# Patient Record
Sex: FEMALE | Race: WHITE | Employment: OTHER | URBAN - METROPOLITAN AREA
[De-identification: names, ages, dates, MRNs, and addresses within clinical notes are randomized per-mention and may not be internally consistent; named-entity substitution may affect disease eponyms.]

---

## 2021-01-01 ENCOUNTER — LAB (OUTPATIENT)
Dept: LAB | Facility: CLINIC | Age: 81
DRG: 454 | End: 2021-01-01
Attending: ORTHOPAEDIC SURGERY
Payer: MEDICARE

## 2021-01-01 ENCOUNTER — APPOINTMENT (OUTPATIENT)
Dept: PHYSICAL THERAPY | Facility: CLINIC | Age: 81
DRG: 454 | End: 2021-01-01
Attending: ORTHOPAEDIC SURGERY
Payer: MEDICARE

## 2021-01-01 ENCOUNTER — APPOINTMENT (OUTPATIENT)
Dept: GENERAL RADIOLOGY | Facility: CLINIC | Age: 81
DRG: 454 | End: 2021-01-01
Attending: ORTHOPAEDIC SURGERY
Payer: MEDICARE

## 2021-01-01 ENCOUNTER — ANESTHESIA (OUTPATIENT)
Dept: SURGERY | Facility: CLINIC | Age: 81
DRG: 454 | End: 2021-01-01
Payer: MEDICARE

## 2021-01-01 ENCOUNTER — HOSPITAL ENCOUNTER (INPATIENT)
Facility: CLINIC | Age: 81
LOS: 2 days | Discharge: HOME | DRG: 454 | End: 2021-10-14
Attending: ORTHOPAEDIC SURGERY | Admitting: ORTHOPAEDIC SURGERY
Payer: MEDICARE

## 2021-01-01 ENCOUNTER — APPOINTMENT (OUTPATIENT)
Dept: OCCUPATIONAL THERAPY | Facility: CLINIC | Age: 81
DRG: 454 | End: 2021-01-01
Attending: INTERNAL MEDICINE
Payer: MEDICARE

## 2021-01-01 ENCOUNTER — ANESTHESIA EVENT (OUTPATIENT)
Dept: SURGERY | Facility: CLINIC | Age: 81
DRG: 454 | End: 2021-01-01
Payer: MEDICARE

## 2021-01-01 ENCOUNTER — APPOINTMENT (OUTPATIENT)
Dept: OCCUPATIONAL THERAPY | Facility: CLINIC | Age: 81
DRG: 454 | End: 2021-01-01
Attending: ORTHOPAEDIC SURGERY
Payer: MEDICARE

## 2021-01-01 VITALS
HEART RATE: 89 BPM | BODY MASS INDEX: 31.63 KG/M2 | OXYGEN SATURATION: 100 % | WEIGHT: 171.9 LBS | DIASTOLIC BLOOD PRESSURE: 49 MMHG | HEIGHT: 62 IN | SYSTOLIC BLOOD PRESSURE: 117 MMHG | TEMPERATURE: 96.7 F | RESPIRATION RATE: 16 BRPM

## 2021-01-01 DIAGNOSIS — Z11.59 ENCOUNTER FOR SCREENING FOR OTHER VIRAL DISEASES: ICD-10-CM

## 2021-01-01 DIAGNOSIS — Z98.1 S/P LUMBAR FUSION: Primary | ICD-10-CM

## 2021-01-01 LAB
ABO/RH(D): NORMAL
ANION GAP SERPL CALCULATED.3IONS-SCNC: 4 MMOL/L (ref 3–14)
ANION GAP SERPL CALCULATED.3IONS-SCNC: 8 MMOL/L (ref 3–14)
ANTIBODY SCREEN: NEGATIVE
ATRIAL RATE - MUSE: 80 BPM
BUN SERPL-MCNC: 30 MG/DL (ref 7–30)
BUN SERPL-MCNC: 31 MG/DL (ref 7–30)
CALCIUM SERPL-MCNC: 8.2 MG/DL (ref 8.5–10.1)
CALCIUM SERPL-MCNC: 8.4 MG/DL (ref 8.5–10.1)
CHLORIDE BLD-SCNC: 105 MMOL/L (ref 94–109)
CHLORIDE BLD-SCNC: 110 MMOL/L (ref 94–109)
CO2 SERPL-SCNC: 20 MMOL/L (ref 20–32)
CO2 SERPL-SCNC: 24 MMOL/L (ref 20–32)
CREAT SERPL-MCNC: 1.37 MG/DL (ref 0.52–1.04)
CREAT SERPL-MCNC: 1.44 MG/DL (ref 0.52–1.04)
DIASTOLIC BLOOD PRESSURE - MUSE: NORMAL MMHG
GFR SERPL CREATININE-BSD FRML MDRD: 34 ML/MIN/1.73M2
GFR SERPL CREATININE-BSD FRML MDRD: 36 ML/MIN/1.73M2
GLUCOSE BLD-MCNC: 123 MG/DL (ref 70–99)
GLUCOSE BLD-MCNC: 125 MG/DL (ref 70–99)
GLUCOSE BLDC GLUCOMTR-MCNC: 108 MG/DL (ref 70–99)
GLUCOSE BLDC GLUCOMTR-MCNC: 118 MG/DL (ref 70–99)
GLUCOSE BLDC GLUCOMTR-MCNC: 122 MG/DL (ref 70–99)
GLUCOSE BLDC GLUCOMTR-MCNC: 123 MG/DL (ref 70–99)
GLUCOSE BLDC GLUCOMTR-MCNC: 124 MG/DL (ref 70–99)
GLUCOSE BLDC GLUCOMTR-MCNC: 127 MG/DL (ref 70–99)
GLUCOSE BLDC GLUCOMTR-MCNC: 127 MG/DL (ref 70–99)
GLUCOSE BLDC GLUCOMTR-MCNC: 129 MG/DL (ref 70–99)
GLUCOSE BLDC GLUCOMTR-MCNC: 131 MG/DL (ref 70–99)
GLUCOSE BLDC GLUCOMTR-MCNC: 138 MG/DL (ref 70–99)
GLUCOSE BLDC GLUCOMTR-MCNC: 141 MG/DL (ref 70–99)
GLUCOSE BLDC GLUCOMTR-MCNC: 148 MG/DL (ref 70–99)
GLUCOSE BLDC GLUCOMTR-MCNC: 153 MG/DL (ref 70–99)
GLUCOSE BLDC GLUCOMTR-MCNC: 93 MG/DL (ref 70–99)
HGB BLD-MCNC: 10 G/DL (ref 11.7–15.7)
HGB BLD-MCNC: 11.4 G/DL (ref 11.7–15.7)
INTERPRETATION ECG - MUSE: NORMAL
P AXIS - MUSE: NORMAL DEGREES
POTASSIUM BLD-SCNC: 4.6 MMOL/L (ref 3.4–5.3)
POTASSIUM BLD-SCNC: 4.8 MMOL/L (ref 3.4–5.3)
PR INTERVAL - MUSE: 162 MS
QRS DURATION - MUSE: 162 MS
QT - MUSE: 480 MS
QTC - MUSE: 553 MS
R AXIS - MUSE: -66 DEGREES
SARS-COV-2 RNA RESP QL NAA+PROBE: NEGATIVE
SODIUM SERPL-SCNC: 133 MMOL/L (ref 133–144)
SODIUM SERPL-SCNC: 138 MMOL/L (ref 133–144)
SPECIMEN EXPIRATION DATE: NORMAL
SYSTOLIC BLOOD PRESSURE - MUSE: NORMAL MMHG
T AXIS - MUSE: 112 DEGREES
VENTRICULAR RATE- MUSE: 80 BPM

## 2021-01-01 PROCEDURE — C1713 ANCHOR/SCREW BN/BN,TIS/BN: HCPCS | Performed by: ORTHOPAEDIC SURGERY

## 2021-01-01 PROCEDURE — 250N000011 HC RX IP 250 OP 636: Performed by: NURSE ANESTHETIST, CERTIFIED REGISTERED

## 2021-01-01 PROCEDURE — 258N000003 HC RX IP 258 OP 636: Performed by: NURSE ANESTHETIST, CERTIFIED REGISTERED

## 2021-01-01 PROCEDURE — 250N000013 HC RX MED GY IP 250 OP 250 PS 637: Performed by: INTERNAL MEDICINE

## 2021-01-01 PROCEDURE — 36415 COLL VENOUS BLD VENIPUNCTURE: CPT | Performed by: INTERNAL MEDICINE

## 2021-01-01 PROCEDURE — 80048 BASIC METABOLIC PNL TOTAL CA: CPT | Performed by: INTERNAL MEDICINE

## 2021-01-01 PROCEDURE — 36415 COLL VENOUS BLD VENIPUNCTURE: CPT | Performed by: ANESTHESIOLOGY

## 2021-01-01 PROCEDURE — 250N000009 HC RX 250: Performed by: ANESTHESIOLOGY

## 2021-01-01 PROCEDURE — 710N000010 HC RECOVERY PHASE 1, LEVEL 2, PER MIN: Performed by: ORTHOPAEDIC SURGERY

## 2021-01-01 PROCEDURE — 99232 SBSQ HOSP IP/OBS MODERATE 35: CPT | Performed by: INTERNAL MEDICINE

## 2021-01-01 PROCEDURE — 258N000003 HC RX IP 258 OP 636: Performed by: INTERNAL MEDICINE

## 2021-01-01 PROCEDURE — 250N000011 HC RX IP 250 OP 636: Performed by: INTERNAL MEDICINE

## 2021-01-01 PROCEDURE — 97530 THERAPEUTIC ACTIVITIES: CPT | Mod: GP | Performed by: PHYSICAL THERAPIST

## 2021-01-01 PROCEDURE — 250N000009 HC RX 250: Performed by: ORTHOPAEDIC SURGERY

## 2021-01-01 PROCEDURE — 97116 GAIT TRAINING THERAPY: CPT | Mod: GP | Performed by: PHYSICAL THERAPIST

## 2021-01-01 PROCEDURE — 360N000085 HC SURGERY LEVEL 5 W/ FLUORO, PER MIN: Performed by: ORTHOPAEDIC SURGERY

## 2021-01-01 PROCEDURE — 999N000179 XR SURGERY CARM FLUORO LESS THAN 5 MIN W STILLS: Mod: TC

## 2021-01-01 PROCEDURE — 250N000011 HC RX IP 250 OP 636: Performed by: PHYSICIAN ASSISTANT

## 2021-01-01 PROCEDURE — 86900 BLOOD TYPING SEROLOGIC ABO: CPT | Performed by: ANESTHESIOLOGY

## 2021-01-01 PROCEDURE — 8E0WXBF COMPUTER ASSISTED PROCEDURE OF TRUNK REGION, WITH FLUOROSCOPY: ICD-10-PCS | Performed by: ORTHOPAEDIC SURGERY

## 2021-01-01 PROCEDURE — 272N000001 HC OR GENERAL SUPPLY STERILE: Performed by: ORTHOPAEDIC SURGERY

## 2021-01-01 PROCEDURE — 0SG00AJ FUSION OF LUMBAR VERTEBRAL JOINT WITH INTERBODY FUSION DEVICE, POSTERIOR APPROACH, ANTERIOR COLUMN, OPEN APPROACH: ICD-10-PCS | Performed by: ORTHOPAEDIC SURGERY

## 2021-01-01 PROCEDURE — 0SG0071 FUSION OF LUMBAR VERTEBRAL JOINT WITH AUTOLOGOUS TISSUE SUBSTITUTE, POSTERIOR APPROACH, POSTERIOR COLUMN, OPEN APPROACH: ICD-10-PCS | Performed by: ORTHOPAEDIC SURGERY

## 2021-01-01 PROCEDURE — 97535 SELF CARE MNGMENT TRAINING: CPT | Mod: GO | Performed by: REHABILITATION PRACTITIONER

## 2021-01-01 PROCEDURE — 250N000009 HC RX 250: Performed by: NURSE ANESTHETIST, CERTIFIED REGISTERED

## 2021-01-01 PROCEDURE — 99223 1ST HOSP IP/OBS HIGH 75: CPT | Performed by: INTERNAL MEDICINE

## 2021-01-01 PROCEDURE — 120N000001 HC R&B MED SURG/OB

## 2021-01-01 PROCEDURE — 85018 HEMOGLOBIN: CPT | Performed by: INTERNAL MEDICINE

## 2021-01-01 PROCEDURE — 250N000005 HC OR RX SURGIFLO HEMOSTATIC MATRIX 10ML 199102S OPNP: Performed by: ORTHOPAEDIC SURGERY

## 2021-01-01 PROCEDURE — G0008 ADMIN INFLUENZA VIRUS VAC: HCPCS | Performed by: INTERNAL MEDICINE

## 2021-01-01 PROCEDURE — 370N000017 HC ANESTHESIA TECHNICAL FEE, PER MIN: Performed by: ORTHOPAEDIC SURGERY

## 2021-01-01 PROCEDURE — 258N000003 HC RX IP 258 OP 636: Performed by: PHYSICIAN ASSISTANT

## 2021-01-01 PROCEDURE — 97165 OT EVAL LOW COMPLEX 30 MIN: CPT | Mod: GO | Performed by: REHABILITATION PRACTITIONER

## 2021-01-01 PROCEDURE — U0003 INFECTIOUS AGENT DETECTION BY NUCLEIC ACID (DNA OR RNA); SEVERE ACUTE RESPIRATORY SYNDROME CORONAVIRUS 2 (SARS-COV-2) (CORONAVIRUS DISEASE [COVID-19]), AMPLIFIED PROBE TECHNIQUE, MAKING USE OF HIGH THROUGHPUT TECHNOLOGIES AS DESCRIBED BY CMS-2020-01-R: HCPCS

## 2021-01-01 PROCEDURE — 258N000003 HC RX IP 258 OP 636: Performed by: ANESTHESIOLOGY

## 2021-01-01 PROCEDURE — 90662 IIV NO PRSV INCREASED AG IM: CPT | Performed by: INTERNAL MEDICINE

## 2021-01-01 PROCEDURE — 97161 PT EVAL LOW COMPLEX 20 MIN: CPT | Mod: GP | Performed by: PHYSICAL THERAPIST

## 2021-01-01 PROCEDURE — 97535 SELF CARE MNGMENT TRAINING: CPT | Mod: GO

## 2021-01-01 PROCEDURE — 0SB20ZZ EXCISION OF LUMBAR VERTEBRAL DISC, OPEN APPROACH: ICD-10-PCS | Performed by: ORTHOPAEDIC SURGERY

## 2021-01-01 PROCEDURE — 999N000141 HC STATISTIC PRE-PROCEDURE NURSING ASSESSMENT: Performed by: ORTHOPAEDIC SURGERY

## 2021-01-01 PROCEDURE — 01NB0ZZ RELEASE LUMBAR NERVE, OPEN APPROACH: ICD-10-PCS | Performed by: ORTHOPAEDIC SURGERY

## 2021-01-01 PROCEDURE — C1762 CONN TISS, HUMAN(INC FASCIA): HCPCS | Performed by: ORTHOPAEDIC SURGERY

## 2021-01-01 DEVICE — GRAFT BONE MAGNIFUSE 1CMX5CM 7509215: Type: IMPLANTABLE DEVICE | Site: SPINE LUMBAR | Status: FUNCTIONAL

## 2021-01-01 DEVICE — IMPLANTABLE DEVICE: Type: IMPLANTABLE DEVICE | Site: SPINE LUMBAR | Status: FUNCTIONAL

## 2021-01-01 DEVICE — GRAFT BONE CRUSH CANC 15ML 400075: Type: IMPLANTABLE DEVICE | Site: SPINE LUMBAR | Status: FUNCTIONAL

## 2021-01-01 RX ORDER — CLINDAMYCIN PHOSPHATE 900 MG/50ML
900 INJECTION, SOLUTION INTRAVENOUS EVERY 8 HOURS
Status: COMPLETED | OUTPATIENT
Start: 2021-01-01 | End: 2021-01-01

## 2021-01-01 RX ORDER — OXYCODONE HYDROCHLORIDE 5 MG/1
5 TABLET ORAL EVERY 4 HOURS PRN
Status: DISCONTINUED | OUTPATIENT
Start: 2021-01-01 | End: 2021-01-01 | Stop reason: HOSPADM

## 2021-01-01 RX ORDER — GLYCOPYRROLATE 0.2 MG/ML
INJECTION, SOLUTION INTRAMUSCULAR; INTRAVENOUS PRN
Status: DISCONTINUED | OUTPATIENT
Start: 2021-01-01 | End: 2021-01-01

## 2021-01-01 RX ORDER — ACETAMINOPHEN 325 MG/1
975 TABLET ORAL EVERY 8 HOURS
Status: DISCONTINUED | OUTPATIENT
Start: 2021-01-01 | End: 2021-01-01 | Stop reason: HOSPADM

## 2021-01-01 RX ORDER — MAGNESIUM HYDROXIDE 1200 MG/15ML
LIQUID ORAL PRN
Status: DISCONTINUED | OUTPATIENT
Start: 2021-01-01 | End: 2021-01-01 | Stop reason: HOSPADM

## 2021-01-01 RX ORDER — OXYCODONE HYDROCHLORIDE 5 MG/1
5-10 TABLET ORAL EVERY 6 HOURS PRN
Qty: 30 TABLET | Refills: 0 | Status: SHIPPED | OUTPATIENT
Start: 2021-01-01

## 2021-01-01 RX ORDER — HYDRALAZINE HYDROCHLORIDE 20 MG/ML
2.5-5 INJECTION INTRAMUSCULAR; INTRAVENOUS EVERY 10 MIN PRN
Status: DISCONTINUED | OUTPATIENT
Start: 2021-01-01 | End: 2021-01-01 | Stop reason: HOSPADM

## 2021-01-01 RX ORDER — VITAMIN B COMPLEX
25 TABLET ORAL 2 TIMES DAILY
Qty: 180 TABLET | Refills: 0 | Status: SHIPPED | OUTPATIENT
Start: 2021-01-01

## 2021-01-01 RX ORDER — CLINDAMYCIN PHOSPHATE 900 MG/50ML
900 INJECTION, SOLUTION INTRAVENOUS
Status: COMPLETED | OUTPATIENT
Start: 2021-01-01 | End: 2021-01-01

## 2021-01-01 RX ORDER — GABAPENTIN 100 MG/1
100 CAPSULE ORAL
Status: DISCONTINUED | OUTPATIENT
Start: 2021-01-01 | End: 2021-01-01 | Stop reason: HOSPADM

## 2021-01-01 RX ORDER — LOSARTAN POTASSIUM 50 MG/1
50 TABLET ORAL DAILY
COMMUNITY
Start: 2021-01-01

## 2021-01-01 RX ORDER — LEVOTHYROXINE SODIUM 75 UG/1
75 TABLET ORAL DAILY
COMMUNITY

## 2021-01-01 RX ORDER — ONDANSETRON 2 MG/ML
4 INJECTION INTRAMUSCULAR; INTRAVENOUS EVERY 6 HOURS PRN
Status: DISCONTINUED | OUTPATIENT
Start: 2021-01-01 | End: 2021-01-01 | Stop reason: HOSPADM

## 2021-01-01 RX ORDER — POLYETHYLENE GLYCOL 3350 17 G/17G
17 POWDER, FOR SOLUTION ORAL DAILY
Status: DISCONTINUED | OUTPATIENT
Start: 2021-01-01 | End: 2021-01-01 | Stop reason: HOSPADM

## 2021-01-01 RX ORDER — BUPIVACAINE HYDROCHLORIDE AND EPINEPHRINE 2.5; 5 MG/ML; UG/ML
INJECTION, SOLUTION EPIDURAL; INFILTRATION; INTRACAUDAL; PERINEURAL PRN
Status: DISCONTINUED | OUTPATIENT
Start: 2021-01-01 | End: 2021-01-01 | Stop reason: HOSPADM

## 2021-01-01 RX ORDER — SODIUM CHLORIDE, SODIUM LACTATE, POTASSIUM CHLORIDE, CALCIUM CHLORIDE 600; 310; 30; 20 MG/100ML; MG/100ML; MG/100ML; MG/100ML
INJECTION, SOLUTION INTRAVENOUS CONTINUOUS
Status: DISCONTINUED | OUTPATIENT
Start: 2021-01-01 | End: 2021-01-01 | Stop reason: HOSPADM

## 2021-01-01 RX ORDER — DEXTROSE MONOHYDRATE 25 G/50ML
25-50 INJECTION, SOLUTION INTRAVENOUS
Status: DISCONTINUED | OUTPATIENT
Start: 2021-01-01 | End: 2021-01-01 | Stop reason: HOSPADM

## 2021-01-01 RX ORDER — NEOSTIGMINE METHYLSULFATE 1 MG/ML
VIAL (ML) INJECTION PRN
Status: DISCONTINUED | OUTPATIENT
Start: 2021-01-01 | End: 2021-01-01

## 2021-01-01 RX ORDER — LIDOCAINE 40 MG/G
CREAM TOPICAL
Status: DISCONTINUED | OUTPATIENT
Start: 2021-01-01 | End: 2021-01-01 | Stop reason: HOSPADM

## 2021-01-01 RX ORDER — HYDROMORPHONE HCL IN WATER/PF 6 MG/30 ML
0.4 PATIENT CONTROLLED ANALGESIA SYRINGE INTRAVENOUS EVERY 5 MIN PRN
Status: DISCONTINUED | OUTPATIENT
Start: 2021-01-01 | End: 2021-01-01 | Stop reason: HOSPADM

## 2021-01-01 RX ORDER — AMOXICILLIN 250 MG
1 CAPSULE ORAL 2 TIMES DAILY
Status: DISCONTINUED | OUTPATIENT
Start: 2021-01-01 | End: 2021-01-01 | Stop reason: HOSPADM

## 2021-01-01 RX ORDER — PROCHLORPERAZINE MALEATE 5 MG
5 TABLET ORAL EVERY 6 HOURS PRN
Status: DISCONTINUED | OUTPATIENT
Start: 2021-01-01 | End: 2021-01-01 | Stop reason: HOSPADM

## 2021-01-01 RX ORDER — FUROSEMIDE 20 MG
20 TABLET ORAL DAILY PRN
COMMUNITY
Start: 2021-02-22

## 2021-01-01 RX ORDER — EPHEDRINE SULFATE 50 MG/ML
INJECTION, SOLUTION INTRAVENOUS PRN
Status: DISCONTINUED | OUTPATIENT
Start: 2021-01-01 | End: 2021-01-01

## 2021-01-01 RX ORDER — NICOTINE POLACRILEX 4 MG
15-30 LOZENGE BUCCAL
Status: DISCONTINUED | OUTPATIENT
Start: 2021-01-01 | End: 2021-01-01 | Stop reason: HOSPADM

## 2021-01-01 RX ORDER — PHENYLEPHRINE HYDROCHLORIDE 10 MG/ML
INJECTION INTRAVENOUS PRN
Status: DISCONTINUED | OUTPATIENT
Start: 2021-01-01 | End: 2021-01-01

## 2021-01-01 RX ORDER — ONDANSETRON 2 MG/ML
4 INJECTION INTRAMUSCULAR; INTRAVENOUS EVERY 30 MIN PRN
Status: DISCONTINUED | OUTPATIENT
Start: 2021-01-01 | End: 2021-01-01 | Stop reason: HOSPADM

## 2021-01-01 RX ORDER — ONDANSETRON 4 MG/1
4 TABLET, ORALLY DISINTEGRATING ORAL EVERY 30 MIN PRN
Status: DISCONTINUED | OUTPATIENT
Start: 2021-01-01 | End: 2021-01-01 | Stop reason: HOSPADM

## 2021-01-01 RX ORDER — SODIUM CHLORIDE, SODIUM LACTATE, POTASSIUM CHLORIDE, CALCIUM CHLORIDE 600; 310; 30; 20 MG/100ML; MG/100ML; MG/100ML; MG/100ML
INJECTION, SOLUTION INTRAVENOUS CONTINUOUS PRN
Status: DISCONTINUED | OUTPATIENT
Start: 2021-01-01 | End: 2021-01-01

## 2021-01-01 RX ORDER — CARVEDILOL 25 MG/1
25 TABLET ORAL 2 TIMES DAILY
COMMUNITY
Start: 2021-02-26

## 2021-01-01 RX ORDER — DEXAMETHASONE SODIUM PHOSPHATE 4 MG/ML
INJECTION, SOLUTION INTRA-ARTICULAR; INTRALESIONAL; INTRAMUSCULAR; INTRAVENOUS; SOFT TISSUE PRN
Status: DISCONTINUED | OUTPATIENT
Start: 2021-01-01 | End: 2021-01-01

## 2021-01-01 RX ORDER — PROPOFOL 10 MG/ML
INJECTION, EMULSION INTRAVENOUS PRN
Status: DISCONTINUED | OUTPATIENT
Start: 2021-01-01 | End: 2021-01-01

## 2021-01-01 RX ORDER — VITAMIN B COMPLEX
25 TABLET ORAL 2 TIMES DAILY
Status: DISCONTINUED | OUTPATIENT
Start: 2021-01-01 | End: 2021-01-01 | Stop reason: HOSPADM

## 2021-01-01 RX ORDER — DAPAGLIFLOZIN 10 MG/1
10 TABLET, FILM COATED ORAL DAILY
COMMUNITY
Start: 2021-01-01

## 2021-01-01 RX ORDER — HYDROMORPHONE HCL IN WATER/PF 6 MG/30 ML
0.4 PATIENT CONTROLLED ANALGESIA SYRINGE INTRAVENOUS
Status: DISCONTINUED | OUTPATIENT
Start: 2021-01-01 | End: 2021-01-01 | Stop reason: HOSPADM

## 2021-01-01 RX ORDER — ACETAMINOPHEN 325 MG/1
650 TABLET ORAL EVERY 4 HOURS PRN
Qty: 100 TABLET | Refills: 0 | Status: SHIPPED | OUTPATIENT
Start: 2021-01-01

## 2021-01-01 RX ORDER — CARVEDILOL 6.25 MG/1
6.25 TABLET ORAL
Status: DISCONTINUED | OUTPATIENT
Start: 2021-01-01 | End: 2021-01-01

## 2021-01-01 RX ORDER — BISACODYL 10 MG
10 SUPPOSITORY, RECTAL RECTAL DAILY PRN
Status: DISCONTINUED | OUTPATIENT
Start: 2021-01-01 | End: 2021-01-01 | Stop reason: HOSPADM

## 2021-01-01 RX ORDER — CARVEDILOL 12.5 MG/1
12.5 TABLET ORAL
Status: DISCONTINUED | OUTPATIENT
Start: 2021-01-01 | End: 2021-01-01 | Stop reason: HOSPADM

## 2021-01-01 RX ORDER — ONDANSETRON 4 MG/1
4 TABLET, ORALLY DISINTEGRATING ORAL EVERY 6 HOURS PRN
Status: DISCONTINUED | OUTPATIENT
Start: 2021-01-01 | End: 2021-01-01 | Stop reason: HOSPADM

## 2021-01-01 RX ORDER — OXYCODONE HYDROCHLORIDE 5 MG/1
10 TABLET ORAL EVERY 4 HOURS PRN
Status: DISCONTINUED | OUTPATIENT
Start: 2021-01-01 | End: 2021-01-01 | Stop reason: HOSPADM

## 2021-01-01 RX ORDER — ALBUTEROL SULFATE 0.83 MG/ML
2.5 SOLUTION RESPIRATORY (INHALATION) EVERY 4 HOURS PRN
Status: DISCONTINUED | OUTPATIENT
Start: 2021-01-01 | End: 2021-01-01 | Stop reason: HOSPADM

## 2021-01-01 RX ORDER — ONDANSETRON 2 MG/ML
INJECTION INTRAMUSCULAR; INTRAVENOUS PRN
Status: DISCONTINUED | OUTPATIENT
Start: 2021-01-01 | End: 2021-01-01

## 2021-01-01 RX ORDER — FENTANYL CITRATE 50 UG/ML
50 INJECTION, SOLUTION INTRAMUSCULAR; INTRAVENOUS EVERY 5 MIN PRN
Status: DISCONTINUED | OUTPATIENT
Start: 2021-01-01 | End: 2021-01-01 | Stop reason: HOSPADM

## 2021-01-01 RX ORDER — ACETAMINOPHEN 325 MG/1
650 TABLET ORAL EVERY 4 HOURS PRN
Status: DISCONTINUED | OUTPATIENT
Start: 2021-01-01 | End: 2021-01-01 | Stop reason: HOSPADM

## 2021-01-01 RX ORDER — HYDROMORPHONE HCL IN WATER/PF 6 MG/30 ML
0.2 PATIENT CONTROLLED ANALGESIA SYRINGE INTRAVENOUS
Status: DISCONTINUED | OUTPATIENT
Start: 2021-01-01 | End: 2021-01-01 | Stop reason: HOSPADM

## 2021-01-01 RX ORDER — FENTANYL CITRATE 50 UG/ML
INJECTION, SOLUTION INTRAMUSCULAR; INTRAVENOUS PRN
Status: DISCONTINUED | OUTPATIENT
Start: 2021-01-01 | End: 2021-01-01

## 2021-01-01 RX ORDER — CLINDAMYCIN PHOSPHATE 900 MG/50ML
900 INJECTION, SOLUTION INTRAVENOUS SEE ADMIN INSTRUCTIONS
Status: DISCONTINUED | OUTPATIENT
Start: 2021-01-01 | End: 2021-01-01 | Stop reason: HOSPADM

## 2021-01-01 RX ORDER — LABETALOL HYDROCHLORIDE 5 MG/ML
10 INJECTION, SOLUTION INTRAVENOUS
Status: DISCONTINUED | OUTPATIENT
Start: 2021-01-01 | End: 2021-01-01 | Stop reason: HOSPADM

## 2021-01-01 RX ORDER — LEVOTHYROXINE SODIUM 75 UG/1
75 TABLET ORAL DAILY
Status: DISCONTINUED | OUTPATIENT
Start: 2021-01-01 | End: 2021-01-01 | Stop reason: HOSPADM

## 2021-01-01 RX ORDER — HYDROXYZINE HYDROCHLORIDE 10 MG/1
10 TABLET, FILM COATED ORAL EVERY 6 HOURS PRN
Status: DISCONTINUED | OUTPATIENT
Start: 2021-01-01 | End: 2021-01-01 | Stop reason: HOSPADM

## 2021-01-01 RX ORDER — LOSARTAN POTASSIUM 100 MG/1
100 TABLET ORAL DAILY
COMMUNITY
Start: 2021-01-13

## 2021-01-01 RX ORDER — NALOXONE HYDROCHLORIDE 0.4 MG/ML
0.2 INJECTION, SOLUTION INTRAMUSCULAR; INTRAVENOUS; SUBCUTANEOUS
Status: DISCONTINUED | OUTPATIENT
Start: 2021-01-01 | End: 2021-01-01 | Stop reason: HOSPADM

## 2021-01-01 RX ORDER — AMOXICILLIN 250 MG
1-2 CAPSULE ORAL 2 TIMES DAILY
Qty: 30 TABLET | Refills: 0 | Status: SHIPPED | OUTPATIENT
Start: 2021-01-01

## 2021-01-01 RX ORDER — NALOXONE HYDROCHLORIDE 0.4 MG/ML
0.4 INJECTION, SOLUTION INTRAMUSCULAR; INTRAVENOUS; SUBCUTANEOUS
Status: DISCONTINUED | OUTPATIENT
Start: 2021-01-01 | End: 2021-01-01 | Stop reason: HOSPADM

## 2021-01-01 RX ORDER — SODIUM CHLORIDE 9 MG/ML
INJECTION, SOLUTION INTRAVENOUS CONTINUOUS
Status: DISCONTINUED | OUTPATIENT
Start: 2021-01-01 | End: 2021-01-01

## 2021-01-01 RX ORDER — SPIRONOLACTONE 25 MG/1
25 TABLET ORAL DAILY
COMMUNITY
Start: 2020-09-17

## 2021-01-01 RX ADMIN — CLINDAMYCIN PHOSPHATE 900 MG: 900 INJECTION, SOLUTION INTRAVENOUS at 23:38

## 2021-01-01 RX ADMIN — PHENYLEPHRINE HYDROCHLORIDE 100 MCG: 10 INJECTION INTRAVENOUS at 08:48

## 2021-01-01 RX ADMIN — ACETAMINOPHEN 975 MG: 325 TABLET, FILM COATED ORAL at 20:37

## 2021-01-01 RX ADMIN — HYDROMORPHONE HYDROCHLORIDE 0.25 MG: 1 INJECTION, SOLUTION INTRAMUSCULAR; INTRAVENOUS; SUBCUTANEOUS at 08:53

## 2021-01-01 RX ADMIN — HYDROMORPHONE HYDROCHLORIDE 0.25 MG: 1 INJECTION, SOLUTION INTRAMUSCULAR; INTRAVENOUS; SUBCUTANEOUS at 10:21

## 2021-01-01 RX ADMIN — ROCURONIUM BROMIDE 10 MG: 50 INJECTION, SOLUTION INTRAVENOUS at 09:45

## 2021-01-01 RX ADMIN — HYDROMORPHONE HYDROCHLORIDE 0.2 MG: 0.2 INJECTION, SOLUTION INTRAMUSCULAR; INTRAVENOUS; SUBCUTANEOUS at 04:59

## 2021-01-01 RX ADMIN — FENTANYL CITRATE 100 MCG: 50 INJECTION, SOLUTION INTRAMUSCULAR; INTRAVENOUS at 08:14

## 2021-01-01 RX ADMIN — LEVOTHYROXINE SODIUM 75 MCG: 0.07 TABLET ORAL at 09:33

## 2021-01-01 RX ADMIN — Medication 1 TABLET: at 06:05

## 2021-01-01 RX ADMIN — PHENYLEPHRINE HYDROCHLORIDE 100 MCG: 10 INJECTION INTRAVENOUS at 08:42

## 2021-01-01 RX ADMIN — EPHEDRINE SULFATE 5 MG: 50 INJECTION, SOLUTION INTRAVENOUS at 08:26

## 2021-01-01 RX ADMIN — ACETAMINOPHEN 975 MG: 325 TABLET, FILM COATED ORAL at 12:37

## 2021-01-01 RX ADMIN — CARVEDILOL 12.5 MG: 12.5 TABLET, FILM COATED ORAL at 17:25

## 2021-01-01 RX ADMIN — OMEPRAZOLE 20 MG: 20 CAPSULE, DELAYED RELEASE ORAL at 06:31

## 2021-01-01 RX ADMIN — OXYCODONE HYDROCHLORIDE 5 MG: 5 TABLET ORAL at 14:47

## 2021-01-01 RX ADMIN — EPHEDRINE SULFATE 5 MG: 50 INJECTION, SOLUTION INTRAVENOUS at 08:25

## 2021-01-01 RX ADMIN — CARVEDILOL 12.5 MG: 12.5 TABLET, FILM COATED ORAL at 06:31

## 2021-01-01 RX ADMIN — ONDANSETRON HYDROCHLORIDE 4 MG: 2 INJECTION, SOLUTION INTRAVENOUS at 10:42

## 2021-01-01 RX ADMIN — SODIUM CHLORIDE, SODIUM LACTATE, POTASSIUM CHLORIDE, CALCIUM CHLORIDE: 600; 310; 30; 20 INJECTION, SOLUTION INTRAVENOUS at 09:07

## 2021-01-01 RX ADMIN — Medication 25 MCG: at 19:09

## 2021-01-01 RX ADMIN — PHENYLEPHRINE HYDROCHLORIDE 100 MCG: 10 INJECTION INTRAVENOUS at 09:02

## 2021-01-01 RX ADMIN — CARVEDILOL 12.5 MG: 12.5 TABLET, FILM COATED ORAL at 06:04

## 2021-01-01 RX ADMIN — ACETAMINOPHEN 975 MG: 325 TABLET, FILM COATED ORAL at 05:00

## 2021-01-01 RX ADMIN — OMEPRAZOLE 20 MG: 20 CAPSULE, DELAYED RELEASE ORAL at 06:04

## 2021-01-01 RX ADMIN — PHENYLEPHRINE HYDROCHLORIDE 200 MCG: 10 INJECTION INTRAVENOUS at 09:07

## 2021-01-01 RX ADMIN — NEOSTIGMINE METHYLSULFATE 4 MG: 1 INJECTION, SOLUTION INTRAVENOUS at 10:46

## 2021-01-01 RX ADMIN — PHENYLEPHRINE HYDROCHLORIDE 0.5 MCG/KG/MIN: 10 INJECTION INTRAVENOUS at 09:07

## 2021-01-01 RX ADMIN — Medication 1 TABLET: at 13:16

## 2021-01-01 RX ADMIN — CLINDAMYCIN PHOSPHATE 900 MG: 900 INJECTION, SOLUTION INTRAVENOUS at 15:32

## 2021-01-01 RX ADMIN — Medication 1 TABLET: at 16:17

## 2021-01-01 RX ADMIN — Medication 25 MCG: at 20:37

## 2021-01-01 RX ADMIN — HYDROXYZINE HYDROCHLORIDE 10 MG: 10 TABLET ORAL at 13:16

## 2021-01-01 RX ADMIN — LEVOTHYROXINE SODIUM 75 MCG: 0.07 TABLET ORAL at 06:05

## 2021-01-01 RX ADMIN — OXYCODONE HYDROCHLORIDE 5 MG: 5 TABLET ORAL at 16:17

## 2021-01-01 RX ADMIN — PHENYLEPHRINE HYDROCHLORIDE 100 MCG: 10 INJECTION INTRAVENOUS at 08:17

## 2021-01-01 RX ADMIN — HYDROMORPHONE HYDROCHLORIDE 0.25 MG: 1 INJECTION, SOLUTION INTRAMUSCULAR; INTRAVENOUS; SUBCUTANEOUS at 09:57

## 2021-01-01 RX ADMIN — PHENYLEPHRINE HYDROCHLORIDE 100 MCG: 10 INJECTION INTRAVENOUS at 08:30

## 2021-01-01 RX ADMIN — OXYCODONE HYDROCHLORIDE 5 MG: 5 TABLET ORAL at 03:09

## 2021-01-01 RX ADMIN — ACETAMINOPHEN 975 MG: 325 TABLET, FILM COATED ORAL at 06:04

## 2021-01-01 RX ADMIN — EPHEDRINE SULFATE 5 MG: 50 INJECTION, SOLUTION INTRAVENOUS at 08:17

## 2021-01-01 RX ADMIN — CLINDAMYCIN PHOSPHATE 900 MG: 900 INJECTION, SOLUTION INTRAVENOUS at 08:07

## 2021-01-01 RX ADMIN — EPHEDRINE SULFATE 5 MG: 50 INJECTION, SOLUTION INTRAVENOUS at 10:36

## 2021-01-01 RX ADMIN — PHENYLEPHRINE HYDROCHLORIDE 100 MCG: 10 INJECTION INTRAVENOUS at 09:00

## 2021-01-01 RX ADMIN — PHENYLEPHRINE HYDROCHLORIDE 100 MCG: 10 INJECTION INTRAVENOUS at 08:38

## 2021-01-01 RX ADMIN — PHENYLEPHRINE HYDROCHLORIDE 100 MCG: 10 INJECTION INTRAVENOUS at 08:25

## 2021-01-01 RX ADMIN — EPHEDRINE SULFATE 10 MG: 50 INJECTION, SOLUTION INTRAVENOUS at 08:36

## 2021-01-01 RX ADMIN — DEXAMETHASONE SODIUM PHOSPHATE 4 MG: 4 INJECTION, SOLUTION INTRA-ARTICULAR; INTRALESIONAL; INTRAMUSCULAR; INTRAVENOUS; SOFT TISSUE at 08:14

## 2021-01-01 RX ADMIN — GLYCOPYRROLATE 0.6 MG: 0.2 INJECTION, SOLUTION INTRAMUSCULAR; INTRAVENOUS at 10:46

## 2021-01-01 RX ADMIN — PROPOFOL 70 MG: 10 INJECTION, EMULSION INTRAVENOUS at 08:14

## 2021-01-01 RX ADMIN — INFLUENZA A VIRUS A/VICTORIA/2570/2019 IVR-215 (H1N1) ANTIGEN (FORMALDEHYDE INACTIVATED), INFLUENZA A VIRUS A/TASMANIA/503/2020 IVR-221 (H3N2) ANTIGEN (FORMALDEHYDE INACTIVATED), INFLUENZA B VIRUS B/PHUKET/3073/2013 ANTIGEN (FORMALDEHYDE INACTIVATED), AND INFLUENZA B VIRUS B/WASHINGTON/02/2019 ANTIGEN (FORMALDEHYDE INACTIVATED) 0.7 ML: 60; 60; 60; 60 INJECTION, SUSPENSION INTRAMUSCULAR at 12:37

## 2021-01-01 RX ADMIN — EPHEDRINE SULFATE 10 MG: 50 INJECTION, SOLUTION INTRAVENOUS at 08:48

## 2021-01-01 RX ADMIN — PROPOFOL 50 MCG/KG/MIN: 10 INJECTION, EMULSION INTRAVENOUS at 08:35

## 2021-01-01 RX ADMIN — Medication 25 MCG: at 08:31

## 2021-01-01 RX ADMIN — Medication 1 TABLET: at 06:32

## 2021-01-01 RX ADMIN — OXYCODONE HYDROCHLORIDE 5 MG: 5 TABLET ORAL at 08:30

## 2021-01-01 RX ADMIN — OXYCODONE HYDROCHLORIDE 5 MG: 5 TABLET ORAL at 01:55

## 2021-01-01 RX ADMIN — ROCURONIUM BROMIDE 50 MG: 50 INJECTION, SOLUTION INTRAVENOUS at 08:14

## 2021-01-01 RX ADMIN — SENNOSIDES AND DOCUSATE SODIUM 1 TABLET: 50; 8.6 TABLET ORAL at 09:33

## 2021-01-01 RX ADMIN — EPHEDRINE SULFATE 5 MG: 50 INJECTION, SOLUTION INTRAVENOUS at 09:34

## 2021-01-01 RX ADMIN — HYDROMORPHONE HYDROCHLORIDE 0.25 MG: 1 INJECTION, SOLUTION INTRAMUSCULAR; INTRAVENOUS; SUBCUTANEOUS at 10:29

## 2021-01-01 RX ADMIN — SENNOSIDES AND DOCUSATE SODIUM 1 TABLET: 50; 8.6 TABLET ORAL at 20:36

## 2021-01-01 RX ADMIN — SODIUM CHLORIDE: 9 INJECTION, SOLUTION INTRAVENOUS at 13:59

## 2021-01-01 RX ADMIN — OXYCODONE HYDROCHLORIDE 5 MG: 5 TABLET ORAL at 09:34

## 2021-01-01 RX ADMIN — PHENYLEPHRINE HYDROCHLORIDE 100 MCG: 10 INJECTION INTRAVENOUS at 08:14

## 2021-01-01 RX ADMIN — ACETAMINOPHEN 975 MG: 325 TABLET, FILM COATED ORAL at 21:25

## 2021-01-01 RX ADMIN — LIDOCAINE HYDROCHLORIDE 5 ML: 10 INJECTION, SOLUTION EPIDURAL; INFILTRATION; INTRACAUDAL; PERINEURAL at 08:14

## 2021-01-01 RX ADMIN — Medication 1 TABLET: at 12:37

## 2021-01-01 RX ADMIN — PROCHLORPERAZINE EDISYLATE 5 MG: 5 INJECTION INTRAMUSCULAR; INTRAVENOUS at 18:16

## 2021-01-01 RX ADMIN — SODIUM CHLORIDE: 9 INJECTION, SOLUTION INTRAVENOUS at 04:59

## 2021-01-01 RX ADMIN — SODIUM CHLORIDE, POTASSIUM CHLORIDE, SODIUM LACTATE AND CALCIUM CHLORIDE: 600; 310; 30; 20 INJECTION, SOLUTION INTRAVENOUS at 07:00

## 2021-01-01 RX ADMIN — EPHEDRINE SULFATE 10 MG: 50 INJECTION, SOLUTION INTRAVENOUS at 09:04

## 2021-01-01 RX ADMIN — ACETAMINOPHEN 975 MG: 325 TABLET, FILM COATED ORAL at 13:15

## 2021-01-01 RX ADMIN — Medication 1 TABLET: at 17:25

## 2021-01-01 RX ADMIN — SENNOSIDES AND DOCUSATE SODIUM 1 TABLET: 50; 8.6 TABLET ORAL at 08:31

## 2021-01-01 RX ADMIN — ONDANSETRON 4 MG: 2 INJECTION INTRAMUSCULAR; INTRAVENOUS at 15:43

## 2021-01-01 RX ADMIN — PHENYLEPHRINE HYDROCHLORIDE 100 MCG: 10 INJECTION INTRAVENOUS at 08:33

## 2021-01-01 RX ADMIN — OXYCODONE HYDROCHLORIDE 5 MG: 5 TABLET ORAL at 19:09

## 2021-01-01 ASSESSMENT — ACTIVITIES OF DAILY LIVING (ADL)
ADLS_ACUITY_SCORE: 7
ADLS_ACUITY_SCORE: 6
ADLS_ACUITY_SCORE: 8
ADLS_ACUITY_SCORE: 6
DIFFICULTY_COMMUNICATING: NO
ADLS_ACUITY_SCORE: 7
ADLS_ACUITY_SCORE: 9
DOING_ERRANDS_INDEPENDENTLY_DIFFICULTY: NO
ADLS_ACUITY_SCORE: 6
ADLS_ACUITY_SCORE: 6
ADLS_ACUITY_SCORE: 8
ADLS_ACUITY_SCORE: 7
ADLS_ACUITY_SCORE: 8
ADLS_ACUITY_SCORE: 7
ADLS_ACUITY_SCORE: 6
DRESSING/BATHING_DIFFICULTY: NO
ADLS_ACUITY_SCORE: 7
CONCENTRATING,_REMEMBERING_OR_MAKING_DECISIONS_DIFFICULTY: NO
FALL_HISTORY_WITHIN_LAST_SIX_MONTHS: NO
WALKING_OR_CLIMBING_STAIRS_DIFFICULTY: NO
DIFFICULTY_EATING/SWALLOWING: NO
TOILETING_ISSUES: NO

## 2021-01-01 ASSESSMENT — MIFFLIN-ST. JEOR
SCORE: 1180.76
SCORE: 1202.98

## 2021-10-12 PROBLEM — Z98.1 S/P LUMBAR FUSION: Status: ACTIVE | Noted: 2021-01-01

## 2021-10-12 NOTE — BRIEF OP NOTE
[facility] Brief Operative Note    Pre-operative diagnosis: L45 Spinal stenosis [M48.00]  Neurogenic claudication (H) [G95.19]  Spondylolisthesis [M43.10]  DDD (degenerative disc disease), cervical [M50.30]   Post-operative diagnosis * No post-op diagnosis entered *  same   Procedure: Procedure(s):  Lumbar 4-5 Posterior Lumbar Instrumented Fusion With Interbody Cage and With Laminectomies   Surgeon(s): Surgeon(s) and Role:     * Mark Tomlin MD - Primary     * Arik Simons PA-C - Assisting   Estimated blood loss: 200 mL    Specimens: * No specimens in log *   Findings: No comp    Mark Tomlin MD

## 2021-10-12 NOTE — ANESTHESIA CARE TRANSFER NOTE
Patient: Neha Oviedo    Procedure: Procedure(s):  Lumbar 4-5 Posterior Lumbar Instrumented Fusion With Interbody Cage and With Laminectomies       Diagnosis: Spinal stenosis [M48.00]  Neurogenic claudication (H) [G95.19]  Spondylolisthesis [M43.10]  DDD (degenerative disc disease), cervical [M50.30]  Diagnosis Additional Information: No value filed.    Anesthesia Type:   General     Note:    Oropharynx: oropharynx clear of all foreign objects  Level of Consciousness: awake  Oxygen Supplementation: face mask  Level of Supplemental Oxygen (L/min / FiO2): 6 lpm  Independent Airway: airway patency satisfactory and stable  Dentition: dentition unchanged  Vital Signs Stable: post-procedure vital signs reviewed and stable  Report to RN Given: handoff report given  Patient transferred to: PACU  Comments: Patient oral suctioned. Patient with spontaneous respirations and adequate tidal volumes. Patient awake and responsive. Extubated in OR to 6L facemask. To PACU ventilating well. VSS. Report given.  Handoff Report: Identifed the Patient, Identified the Reponsible Provider, Reviewed the pertinent medical history, Discussed the surgical course, Reviewed Intra-OP anesthesia mangement and issues during anesthesia, Set expectations for post-procedure period and Allowed opportunity for questions and acknowledgement of understanding      Vitals:  Vitals Value Taken Time   BP 95/60 10/12/21 1115   Temp     Pulse 81 10/12/21 1116   Resp 15 10/12/21 1116   SpO2 99 % 10/12/21 1116   Vitals shown include unvalidated device data.    Electronically Signed By: SHYAM Coronado CRNA  October 12, 2021  11:18 AM   IV fluids and anti-emetics to be give on pump d/c day. Orders in and signed. Encouraged to call with questions. Navigation will continue to follow.

## 2021-10-12 NOTE — PHARMACY-ADMISSION MEDICATION HISTORY
Pharmacy reviewed prior to admission med list from pre-admitting rnROSA.        Prior to Admission medications    Medication Sig Last Dose Taking? Auth Provider   acetaminophen-codeine (TYLENOL #3) 300-30 MG tablet Take 1-2 tablets by mouth every 6 hours as needed for severe pain 10/11/2021 at 1700 Yes Reported, Patient   calcium carbonate-vitamin D 600-125 MG-UNIT TABS Take by mouth daily 10/11/2021 at 1700 Yes Reported, Patient   carvedilol (COREG) 25 MG tablet Take 25 mg by mouth 2 times daily 10/12/2021 at 0445 Yes Reported, Patient   dapagliflozin (FARXIGA) 10 MG TABS tablet Take 10 mg by mouth daily 10/11/2021 at 0800 Yes Reported, Patient   furosemide (LASIX) 20 MG tablet Take 20 mg by mouth daily as needed for edema Unknown at Unknown time Yes Reported, Patient   levothyroxine (SYNTHROID/LEVOTHROID) 75 MCG tablet Take 75 mcg by mouth daily 10/12/2021 at 0445 Yes Reported, Patient   losartan (COZAAR) 100 MG tablet Take 100 mg by mouth daily Total dose 150 mg 10/12/2021 at 0445 Yes Reported, Patient   losartan (COZAAR) 50 MG tablet Take 50 mg by mouth daily Total dose 150 mg daily 10/12/2021 at 0445 Yes Reported, Patient   MAGNESIUM PO Take 300 mg by mouth daily 10/11/2021 at 1700 Yes Reported, Patient   Menthol-Methyl Salicylate (SALONPAS PAIN RELIEF PATCH EX) Externally apply topically daily as needed Unknown at Unknown time Yes Reported, Patient   omeprazole (PRILOSEC) 20 MG DR capsule Take 20 mg by mouth daily 10/12/2021 at 0445 Yes Reported, Patient   spironolactone (ALDACTONE) 25 MG tablet Take 25 mg by mouth daily 10/11/2021 at 0800 Yes Reported, Patient   aspirin (ASA) 81 MG EC tablet Take 81 mg by mouth daily  9/29/2021  Reported, Patient

## 2021-10-12 NOTE — LETTER
Shari Rodriges RN Case Manager  Inpatient Care Coordination  [facility]   [phone]    Home Care PT  Discharge Today 10/14

## 2021-10-12 NOTE — OP NOTE
Procedure Date: 10/12/2021    PREOPERATIVE DIAGNOSES:    1.  L4 to L5 degenerative spondylolisthesis.  2.  Severe spinal stenosis.  3.  L4 to L5 degenerative disk disease.  4.  Severe neurogenic claudication.    POSTOPERATIVE DIAGNOSES:    1.  L4 to L5 degenerative spondylolisthesis.  2.  Severe spinal stenosis.  3.  L4 to L5 degenerative disk disease.  4.  Severe neurogenic claudication.    PROCEDURES PERFORMED:  1.  L4 to L5 transforaminal lumbar interbody fusion.  2.  Application of intervertebral biomechanical device for interbody fusion purposes.  3.  Posterior fusion using a posterior intertransverse membrane technique.  4.  Posterior instrumentation using bilateral pedicle screw and krista construct spanning from L4 to L5.  5.  Local autograft bone.  6.  Crushed cancellous allograft bone.  7.  Central laminectomy L4.  8.  Central laminectomy L5.  9.  Fluoroscopy.  10.  Medtronic O-arm.    SURGEON:  Mark Tomlin MD    ASSISTANT:  Arik Simons PA-C    ANESTHESIA:  General endotracheal anesthesia without complication.    COMPLICATIONS:  None.    DRAINS:  One Hemovac drain placed prior to closure routinely deep to the lumbar fascia and taken out a separate poke hole and tied to the skin with a nylon stitch to prevent backout.    ESTIMATED BLOOD LOSS:  200 mL.    FINDINGS:  Full decompression L4 to L5 without complications.    INDICATIONS FOR PROCEDURE:  Neha is an 81-year-old female who had [facility] with severe neurogenic claudication.  She had severe end-stage spinal stenosis seen on a CT myelogram.  She had earlier scheduled the same surgery, but had to cancel due to some health concerns of her 's.  She later presented once again wanting to proceed with surgery.  She had presented with a consent form, which was read, understood and signed.  She had failed all conservative care prior to that and essentially had no meaningful options moving forward other than surgery due  to the structural nature of her diagnosis.    DESCRIPTION OF PROCEDURE:  On the date of procedure, she was seen in the preop area.  Questions were answered.  Skin was marked.  Consent was signed by both parties.  After finding no contraindications to proceed with surgery in the preop Anesthesia assessment, she was brought back to the operating room.  In the OR, she was sedated and an ET tube was placed.  Patrick catheter was placed under sterile conditions.  She was positioned prone over a Quentin table.  Eyes were free of compression.  SCDs were in place.  Shoulders and elbows were at 90 degrees with the shoulders slightly flexed forward.  The lumbar region was prepped and draped in standard fashion.  Timeout was performed and IV antibiotics were administered.  We had localized over L4 to L5.  The midline was marked and infiltrated with Marcaine mixed with epinephrine.  A small midline incision was created.  I dissected down through the skin and subcutaneous tissue to the lumbar fascia, which was split in midline.  The spinous process was exposed and clamped.  Lateral x-ray demonstrated L5 and we continued.  We exposed from L3 to L5, planning to place our spinous process clamp on L3.  The dissection was continued to expose L4 and L5 transverse processes.  The L4 to L5 facet capsule and osteophytes were taken down.  All bone graft was saved for later incorporation into our fusion bone graft material.  We spared the facet joints of L3 to L4 and L5 to S1.  With the exposure completed, a spinous process clamp was placed on L3.  Hiperos O-arm was brought into the room and a spin was completed.  All information was transferred to the Stealth system successfully.    We reentered the wound.  We made our  holes for our 4 pedicle screws using a Stealth-guided drill.  The  holes were continued into the vertebral body with a straight thoracic awl.  Pedicle screws were measured independently.  Each bilateral gutter  was then decorticated including the transverse processes, pars and lateral facet joint remaining.  Each gutter was then packed with a 1 x 5 MagniFuse for posterior fusion purposes.  We did come back later in the case and backfill the gutters with local and crushed cancellous allograft bone mixture.    We then moved on to instrumentation.  Using a PowerEase drill, we placed our 4 pedicle screws very carefully and without complications.  After 4 screws were inserted without complications, I then placed distraction using distractors and 4 end caps.  I then moved on to the laminectomy.  The L4 and L5 spinous processes were taken down with a Leksell rongeur, completely at L4, partially at L5.  The high-speed bur was then used to thin the lamina L4 and superior L5.  We did have a suction line bone dust filter in place, collecting all of our shavings, which were later incorporated into our bone mixture.  With the lamina thinned, I then moved over to a #3 Kerrison and a full decompression/central laminectomy was performed at L3 and L4.  This allowed us to remove the ligamentum flavum in its entirety through the lateral recesses and from origin to insertion.  This very nicely decompressed the thecal sac as planned.  I then moved on to our interbody fusion process.  As I stood from the patient's left side, I exposed the complete lateral dura and the disk space from the left.  With the thecal sac protected with a D'Errico retractor, I then performed an annulotomy.  Neurologic elements were protected with cottonoids.  A diskectomy was then performed with standard amadeo, curettes and pituitaries.  Trialing was performed and the disk space was then filled with bone graft material, which was medialized to the right.  This was followed by our predetermined size of 10 x 26 interbody expandable titanium cage, expandable from 7-14 mm.  I did expand it and it stopped short of full distraction.  It should be noted that distraction had  been taken off of our bilateral distractors prior to expansion of our interbody cage.  This had an excellent manual fit.  This completed our interbody fusion process.  Two 45 mm rods were then placed from L4 to L5.  Four end caps were applied and final tightened.  Final x-rays were obtained.  A drain was placed deep to the lumbar fascia, taken out a separate poke hole and tied to the skin with a nylon stitch.  The wound was copiously irrigated and suctioned on several occasions.  We then incorporated the remaining bone graft material into the bilateral gutters for posterior fusion.  All counts were correct.  No complications.  Spinous process clamp had been removed.  The wounds were closed in layers including the lumbar fascia, subcutaneous tissue and skin.  The wound was cleaned and dried.  Dermabond was applied.  Sterile dressings were applied.  Drapes were taken down.  She was rolled back into supine position onto the hospital cart, extubated and brought to the PACU in stable condition.    Arik Simons PA-C was present through the entirety of the procedure and present from start to finish.  He was absolutely necessary.  We did use autograft and allograft bone.  Today, we used Globus interbody cage and pedicle screws and rods.  Counts were correct prior to closure without complications.    Mark Tomlin MD        D: 10/12/2021   T: 10/12/2021   MT: [id number]    Name:     CARMENZA LEÓN  MRN:      [id number]        Account:        [id number]   :      1940           Procedure Date: 10/12/2021     Document: [id number]

## 2021-10-12 NOTE — CONSULTS
[facility]  Hospitalist Consult Note  Name: Neha Oviedo    MRN: [id number]  YOB: 1940    Age: 81 year old  Date of admission: 10/12/2021  Primary care provider: Richard Escamilla MD     Requesting Physician: Dr. Mark Tomlin  Reason for consult:  Post-operative medical management         Assessment and Plan:   Neha Oviedo is a 81 year old female with a history of type 2 diabetes, hypertension, hypothyroidism, severe nonischemic systolic cardiomyopathy with EF of 20 to 25% in May 2021 who was admitted for L4-L5 lumbar fusion in the setting of neurogenic claudication.  Postoperatively she is doing well but blood pressure is low-normal so some of her blood pressure medications are being held or reduced as noted below but these will need to be readdressed promptly in the morning.    She does have some postoperative nausea and vomiting right now so we are continuing low rate IV fluids for the time being, stop time after 16 hours total.  Reassess in the morning.    1. DJD s/p L4-L5 fusion on 10/12/2021: The patient is doing well, currently has well controlled pain and is hemodynamically stable. Will defer diet, activity, DVT prophylaxis, and pain control to the primary team.     2.  History of hypertension: We will reduce her Coreg 25 mg twice daily to 12.5 mg twice daily with hold parameters given that her systolic blood pressures are currently around 100.  We will hold losartan, Lasix spironolactone overnight.  Readdress in the morning.  Will need careful blood pressure and volume management in the setting of severe cardiomyopathy as below.    3.  History of type 2 diabetes normal remained on dapagliflozin; A1c per her preop was 5.9.  For now we will simply continue sliding scale insulin.  Hold dapagliflozin likely until discharge.  We will also hold aspirin until given the okay to restart this by surgery.    4.  History of hypothyroidism: Resume Synthroid    5.  GERD: Continue  omeprazole    6.  Chronic systolic nonischemic cardiomyopathy: EF of just 20 to 25% in May 2021.  At one point her EF was as low as 15% back in 2005.  Reportedly has had normal coronaries on coronary angiogram and more recently has been found to have mild to moderate nonobstructive coronary disease on low-dose aspirin.  Have an ICD in place.  Also has history of mitraclip.  Follows at  with the [facility].  Most recent clinic note from September 17 reviewed.  --Hold losartan and reduce Coreg as above given systolic blood pressures of around 100  --Hold Lasix and spironolactone for now, reassess in the morning pending blood pressure trend  --Need to be cautious with IV fluids  --Hold aspirin as above    7.  ICD status    8.  Suspected CKD: Creatinine 1.51 on preop.  We will recheck a basic metabolic panel in the morning.  Avoid nephrotoxins as able.    9.  Postoperative nausea and vomiting: Getting antiemetics.  Continue normal saline at a relatively cautious rate of 75 cc/h and revisit in the morning.  I have only ordered this for 16 hours total.    Thank you for the consultation, we will continue to follow along during the hospitalization. Please page with any questions or concerns.         History of Present Illness:   Neha Oviedo is a 81 year old female with a history of type 2 diabetes, hypertension, hypothyroidism, severe nonischemic systolic cardiomyopathy with EF of 20 to 25% in May 2021 who was admitted for L4-L5 lumbar fusion in the setting of neurogenic claudication.  Postoperatively she is doing well but blood pressure is low-normal.  In the high 90s to low 100s.  She was last seen by her cardiologist in the [facility] system in September.  Per review of that note her EF was actually as low as 15% back in 2005.  She has had a coronary angiogram multiple times and at worst showed nonobstructive coronary disease.  She is on multiple blood pressure  "medications including Coreg, losartan, spironolactone and Lasix.  Apparently had facial itching with Entresto in the past.    Regarding today's events she is actually fairly nauseated at this point and somewhat groggy.  She did vomit a couple of times.  She is getting antiemetics.  She has low rate IV fluids running.  Her son was present and he was able to corroborate much of the story above and answer some questions.                Past Medical History:     Past Medical History:   Diagnosis Date     Cardiomyopathy (H)      Congestive heart failure (H)      Diabetes (H)     type 2     Gastroesophageal reflux disease      Hypertension      MVP (mitral valve prolapse)     has clip     Pacemaker     Medtronic pacemaker/ICD     Thyroid disease     hypothyroidism             Past Surgical History:     Past Surgical History:   Procedure Laterality Date     CARDIAC SURGERY      pacemaker/icd placed     GYN SURGERY      hysterectomy     ORTHOPEDIC SURGERY      rotator cuff repairs, x1 each shoulder               Social History:     Social History     Tobacco Use     Smoking status: Never Smoker     Smokeless tobacco: Never Used   Substance Use Topics     Alcohol use: Never             Family History:   Family history was fully reviewed and non-contributory in this case.          Allergies:     Allergies   Allergen Reactions     Cefuroxime Swelling     Other reaction(s): Edema  \"left ankle swelling\"       Ciprofibrate Other (See Comments)     unknown     Ciprofloxacin      Other reaction(s): *Unknown     Erythromycin Nausea and Vomiting and Nausea     Gatifloxacin Other (See Comments)     Other reaction(s): *Unknown     Hydrocodone-Acetaminophen Nausea and Vomiting     Irbesartan Dizziness     Other reaction(s): Other (see comments)  Dizziness       Levofloxacin Swelling     Other reaction(s): Edema     Lisinopril Cough     Other reaction(s): Cough     Olmesartan Other (See Comments)     Other reaction(s): " Dizziness  Dizziness       Peanut (Diagnostic) Hives     Sulfamethoxazole-Trimethoprim Unknown and Other (See Comments)     unknown               Medications:     Prior to Admission medications    Medication Sig Last Dose Taking? Auth Provider   acetaminophen-codeine (TYLENOL #3) 300-30 MG tablet Take 1-2 tablets by mouth every 6 hours as needed for severe pain 10/11/2021 at 1700 Yes Reported, Patient   calcium carbonate-vitamin D 600-125 MG-UNIT TABS Take by mouth daily 10/11/2021 at 1700 Yes Reported, Patient   carvedilol (COREG) 25 MG tablet Take 25 mg by mouth 2 times daily 10/12/2021 at 0445 Yes Reported, Patient   dapagliflozin (FARXIGA) 10 MG TABS tablet Take 10 mg by mouth daily 10/11/2021 at 0800 Yes Reported, Patient   furosemide (LASIX) 20 MG tablet Take 20 mg by mouth daily as needed for edema Unknown at Unknown time Yes Reported, Patient   levothyroxine (SYNTHROID/LEVOTHROID) 75 MCG tablet Take 75 mcg by mouth daily 10/12/2021 at 0445 Yes Reported, Patient   losartan (COZAAR) 100 MG tablet Take 100 mg by mouth daily Total dose 150 mg 10/12/2021 at 0445 Yes Reported, Patient   losartan (COZAAR) 50 MG tablet Take 50 mg by mouth daily Total dose 150 mg daily 10/12/2021 at 0445 Yes Reported, Patient   MAGNESIUM PO Take 300 mg by mouth daily 10/11/2021 at 1700 Yes Reported, Patient   Menthol-Methyl Salicylate (SALONPAS PAIN RELIEF PATCH EX) Externally apply topically daily as needed Unknown at Unknown time Yes Reported, Patient   omeprazole (PRILOSEC) 20 MG DR capsule Take 20 mg by mouth daily 10/12/2021 at 0445 Yes Reported, Patient   spironolactone (ALDACTONE) 25 MG tablet Take 25 mg by mouth daily 10/11/2021 at 0800 Yes Reported, Patient   aspirin (ASA) 81 MG EC tablet Take 81 mg by mouth daily  9/29/2021  Reported, Patient       Current hospital administered medication list (MAR) also reviewed.          Review of Systems:   A comprehensive greater than 10 system review of systems was carried out.   "Pertinent positives and negatives are noted above.  Otherwise negative for contributory info.            Physical Exam:   Blood pressure 100/55, pulse 80, temperature (!) 95.4  F (35.2  C), temperature source Temporal, resp. rate 12, height 1.575 m (5' 2\"), weight 78 kg (171 lb 14.4 oz), SpO2 97 %.  Exam:  GENERAL: Pleasant elderly woman who appears uncomfortable and nauseated, holding an emesis bag.  Slightly groggy but alert and oriented x4.  HEENT: Normocephalic, atraumatic. Extraocular movements intact.  CARDIOVASCULAR: Regular rate and rhythm without murmurs or rubs. No S3.  PULMONARY: Clear bilaterally.  ABDOMINAL: Soft, non-tender, non-distended. Bowel sounds normoactive. No hepatosplenomegaly.  EXTREMITIES: Trace lower extremity edema.  No cyanosis or clubbing.  NEUROLOGICAL: CN 2-12 grossly intact, no focal neurological deficits.  DERMATOLOGICAL: No rash, ulcer, ecchymoses, jaundice.         Data:   Imaging:  Reviewed.    EKG/Telemetry:  Reviewed.    Labs: Reviewed.   No results for input(s): WBC, HGB, HCT, MCV, PLT in the last 168 hours.       No results found for: NA No results found for: CHLORIDE No results found for: BUN   No results found for: POTASSIUM No results found for: CO2 No results found for: CR       Luis Manuel Iglesias MD  [facility] Hospitalist  October 12, 2021    "

## 2021-10-12 NOTE — LETTER
Shari Rodriges RN Case Manager  Inpatient Care Coordination  [facility]   [phone]    New Referral  Home Care PT

## 2021-10-12 NOTE — PLAN OF CARE
From PACU at 1315, very lethargic, drowsy but easily awake to voice now.  Tearful at times, thoroughly explained POC encouraging relaxation/rest between cares.  Pain tolerable with cold & repositioning, declined PRN narcs.  Nauseated dry heaves worsened with activity/repositions, managed w/PRN IV antiemetics, sips of clears & rest.  LS clear bilaterally, O2, encouraged CDB hourly.  BS faint, gas-, LBM 10/10.  Patrick scant output, monitor.  Hemovac patent.  CMS+ except mild edema BUEs & bilateral ankles, elevated.  Drsg CDI.  Dangled at bedside w/A2 belt + walker, reposition w/A2.  Oriented to room and call system, denies questions.  Educated on IS use hourly, need reinforcements.  Pt hope DC home upon DC vs. TCU.

## 2021-10-12 NOTE — ANESTHESIA PROCEDURE NOTES
Arterial Line Procedure Note    Pre-Procedure   Staff -        Anesthesiologist:  Erica Castillo MD       Performed By: anesthesiologist       Location: OR       Procedure Start/Stop Times: 10/12/2021 8:25 AM       Pre-Anesthestic Checklist: patient identified, IV checked, risks and benefits discussed, informed consent, monitors and equipment checked, pre-op evaluation and at physician/surgeon's request  Timeout:       Correct Patient: Yes        Correct Procedure: Yes        Correct Site: Yes        Correct Position: Yes   Procedure   Procedure: arterial line       Laterality: left       Insertion Site: radial.  Sterile Prep        Skin prep: Chloraprep  Insertion/Injection        Technique: Seldinger Technique        Catheter Type/Size: 20 G, 12 cm  Narrative        Tegaderm dressing used.       Complications: None apparent,        Arterial waveform: Yes        IBP within 10% of NIBP: Yes

## 2021-10-12 NOTE — ANESTHESIA PROCEDURE NOTES
Airway       Patient location during procedure: OR       Procedure Start/Stop Times: 10/12/2021 8:17 AM  Staff -        Anesthesiologist:  Erica Castillo MD       CRNA: Quynh Carey APRN CRNA       Performed By: CRNA  Consent for Airway        Urgency: elective  Indications and Patient Condition       Indications for airway management: mic-procedural       Induction type:intravenous       Mask difficulty assessment: 1 - vent by mask    Final Airway Details       Final airway type: endotracheal airway       Successful airway: ETT - single  Endotracheal Airway Details        ETT size (mm): 7.0       Cuffed: yes       Cuff volume (mL): 5       Successful intubation technique: direct laryngoscopy       DL Blade Type: Noriega 2       Grade View of Cords: 1       Adjucts: stylet       Position: Right       Measured from: lips       Secured at (cm): 21       Bite block used: None    Post intubation assessment        Placement verified by: capnometry, equal breath sounds and chest rise        Number of attempts at approach: 1       Number of other approaches attempted: 0       Secured with: other (comment) (silicone tape)       Ease of procedure: easy       Dentition: Intact and Unchanged

## 2021-10-12 NOTE — ANESTHESIA POSTPROCEDURE EVALUATION
Patient: Neha Oveido    Procedure: Procedure(s):  Lumbar 4-5 Posterior Lumbar Instrumented Fusion With Interbody Cage and With Laminectomies       Diagnosis:Spinal stenosis [M48.00]  Neurogenic claudication (H) [G95.19]  Spondylolisthesis [M43.10]  DDD (degenerative disc disease), cervical [M50.30]  Diagnosis Additional Information: No value filed.    Anesthesia Type:  General    Note:  Disposition: Outpatient   Postop Pain Control: Uneventful            Sign Out: Well controlled pain   PONV: No   Neuro/Psych: Uneventful            Sign Out: Acceptable/Baseline neuro status   Airway/Respiratory: Uneventful            Sign Out: Acceptable/Baseline resp. status   CV/Hemodynamics: Uneventful            Sign Out: Acceptable CV status; No obvious hypovolemia; No obvious fluid overload   Other NRE: NONE   DID A NON-ROUTINE EVENT OCCUR? No           Last vitals:  Vitals Value Taken Time   /58 10/12/21 1322   Temp 95.4  F (35.2  C) 10/12/21 1324   Pulse 80 10/12/21 1322   Resp 14 10/12/21 1324   SpO2 91 % 10/12/21 1324       Electronically Signed By: Erica Castillo MD  October 12, 2021  1:28 PM

## 2021-10-12 NOTE — LETTER
Shari Rodriges RN Case Manager  Inpatient Care Coordination  [facility]   [phone]    New Referral  Home Care PT

## 2021-10-12 NOTE — ANESTHESIA PREPROCEDURE EVALUATION
"Anesthesia Pre-Procedure Evaluation    Patient: Neha Oviedo   MRN: [id number] : 1940        Preoperative Diagnosis: Spinal stenosis [M48.00]  Neurogenic claudication (H) [G95.19]  Spondylolisthesis [M43.10]  DDD (degenerative disc disease), cervical [M50.30]    Procedure : Procedure(s):  Lumbar 4-5 Posterior Lumbar Instrumented Fusion With or Without Interbody Cage and With or Without Laminectomies          Past Medical History:   Diagnosis Date     Cardiomyopathy (H)      Congestive heart failure (H)      Diabetes (H)     type 2     Gastroesophageal reflux disease      Hypertension      MVP (mitral valve prolapse)     has clip     Pacemaker     Medtronic pacemaker/ICD     Thyroid disease     hypothyroidism      Past Surgical History:   Procedure Laterality Date     CARDIAC SURGERY      pacemaker/icd placed     GYN SURGERY      hysterectomy     ORTHOPEDIC SURGERY      rotator cuff repairs, x1 each shoulder      Allergies   Allergen Reactions     Cefuroxime Swelling     Other reaction(s): Edema  \"left ankle swelling\"       Ciprofibrate Other (See Comments)     unknown     Ciprofloxacin      Other reaction(s): *Unknown     Erythromycin Nausea and Vomiting and Nausea     Gatifloxacin Other (See Comments)     Other reaction(s): *Unknown     Hydrocodone-Acetaminophen Nausea and Vomiting     Irbesartan Dizziness     Other reaction(s): Other (see comments)  Dizziness       Levofloxacin Swelling     Other reaction(s): Edema     Lisinopril Cough     Other reaction(s): Cough     Olmesartan Other (See Comments)     Other reaction(s): Dizziness  Dizziness       Peanut (Diagnostic) Hives     Sulfamethoxazole-Trimethoprim Unknown and Other (See Comments)     unknown        Social History     Tobacco Use     Smoking status: Never Smoker     Smokeless tobacco: Never Used   Substance Use Topics     Alcohol use: Never      Wt Readings from Last 1 Encounters:   10/12/21 78 kg (171 lb 14.4 oz)        Anesthesia " Evaluation            ROS/MED HX  ENT/Pulmonary:  - neg pulmonary ROS     Neurologic:       Cardiovascular: Comment: Nonischemic cardiomyopathy.  A.  Ejection fraction 15% 2005.  B.  Status post biventricular pacemaker/ICD at that time, high degrees of biventricular pacing since that time.  C.  EF 55% in April 2016.  D.  EF 35-40% in March 2018 with a dilated left ventricle.  E.  EF 35-40% in December 2019.  F.  EF 25% September 2020 / May 2021    Mitral valve prolapse with probably severe insufficiency.  A.  JOSIAH August 2017 showing a small flail segment best seen on 3D images.  B.  MitraClip placed 11/13/2019 reducing her MR to mild - moderate    Mild to moderate nonischemic coronary disease per coronary angiogram March 2018.      (+) hypertension-----CHF ICD     METS/Exercise Tolerance:     Hematologic:       Musculoskeletal:       GI/Hepatic:       Renal/Genitourinary:     (+) renal disease (Cr 1.5-1.8), type: CRI,     Endo:     (+) type II DM, thyroid problem, hypothyroidism,     Psychiatric/Substance Use:       Infectious Disease:       Malignancy:       Other:            Physical Exam    Airway        Mallampati: II   TM distance: > 3 FB   Neck ROM: full     Respiratory Devices and Support         Dental           Cardiovascular   cardiovascular exam normal          Pulmonary   pulmonary exam normal                OUTSIDE LABS:  CBC: No results found for: WBC, HGB, HCT, PLT  BMP:   Lab Results   Component Value Date     (H) 10/12/2021     COAGS: No results found for: PTT, INR, FIBR  POC: No results found for: BGM, HCG, HCGS  HEPATIC: No results found for: ALBUMIN, PROTTOTAL, ALT, AST, GGT, ALKPHOS, BILITOTAL, BILIDIRECT, SREEKANTH  OTHER: No results found for: PH, LACT, A1C, DIPIKA, PHOS, MAG, LIPASE, AMYLASE, TSH, T4, T3, CRP, SED    Anesthesia Plan    ASA Status:  3   NPO Status:  NPO Appropriate    Anesthesia Type: General.     - Airway: ETT      Maintenance: Balanced.   Techniques and Equipment:     -  Lines/Monitors: Arterial Line     Consents    Anesthesia Plan(s) and associated risks, benefits, and realistic alternatives discussed. Questions answered and patient/representative(s) expressed understanding.     - Discussed with:  Patient         Postoperative Care       PONV prophylaxis: Ondansetron (or other 5HT-3), Dexamethasone or Solumedrol     Comments:                Erica Castillo MD

## 2021-10-12 NOTE — LETTER
Shari Rodriges RN Case Manager  Inpatient Care Coordination  [facility]   [phone]    New Referral Discharge Orders  Home Care PT

## 2021-10-13 NOTE — PROGRESS NOTES
"Ortho Rounding Note    S: Pt in bed, LBP controlled with PO meds. Pre op radicular pain has subsided. Denies SOB, CP, n/v/f/c, no ortho concerns.     O:  Vital signs:   Blood pressure 103/42, pulse 88, temperature 96.9  F (36.1  C), temperature source Temporal, resp. rate 16, height 1.575 m (5' 2\"), weight 78 kg (171 lb 14.4 oz), SpO2 95 %.  Estimated body mass index is 31.44 kg/m  as calculated from the following:    Height as of this encounter: 1.575 m (5' 2\").    Weight as of this encounter: 78 kg (171 lb 14.4 oz).      Intake/Output Summary (Last 24 hours) at 10/13/2021 1330  Last data filed at 10/13/2021 1315  Gross per 24 hour   Intake 2068 ml   Output 1000 ml   Net 1068 ml         Drain intact  Dressings c/d/i  5/5 motor and SPLT in BL UE and LE    A:  POD #1 s/p L4-5 TLIF    P:  General: Doing well orthopaedically. Pre op symptoms improved. Has been up with therapy although slow as expected. Continue with daily cares, PT/OT.  Pain: PO  Act: up ad neila, with therapy  DVT: Mech only  ID: routine postop abx to be completed 24 hours after surgery  Dispo: Anticipate 1-2 additional midnights prior to discharging to home.     Appreciate Medicine consult for medical management    Arik Simons PA-C    "

## 2021-10-13 NOTE — PROVIDER NOTIFICATION
2041 MD paged, 30mL of urine per hour over the last 5 hours. hx of CHF receiving gentle hydration due to N/V and soft BP. suspected CKD Cr. 1.51 on preop. would you like any addtional orders?

## 2021-10-13 NOTE — CONSULTS
Care Management Follow Up    Length of Stay (days): 1    Expected Discharge Date: 10/14/2021     Concerns to be Addressed:   Discharge Planning  Patient plan of care discussed at interdisciplinary rounds: Yes    Anticipated Discharge Disposition: Home, Home Care     Anticipated Discharge Services: None  Anticipated Discharge DME: None    Patient/family educated on Medicare website which has current facility and service quality ratings: yes  Education Provided on the Discharge Plan:  Yes  Patient/Family in Agreement with the Plan: yes    Referrals Placed by CM/SW:  None  Private pay costs discussed: Not applicable    Additional Information:  CM following for discharge planning. Noted recommendations for home care PT.  Met with patient at bedside. Discussed recommendation for home care PT, agency choice, and coverage through Medicare with homebound status. Pt/family was provided with the Medicare Compare list for Home Care.  Discussed associated Medicare star ratings to assist with choice for referrals/discharge planning Yes    Education was given to pt/family that star ratings are updated/maintained by Medicare and can be reviewed by visiting www.medicare.gov Yes    Patient understanding and agreeable to home care PT. Patient will review agencies with family.    CM will follow up for final discharge recommendations and with patient tomorrow for home care agency choice.     Shari Rodriges, RN      Shari Rodriges RN Case Manager  Inpatient Care Coordination  [facility]   [phone]

## 2021-10-13 NOTE — PROGRESS NOTES
10/13/21 1345   Quick Adds   Type of Visit Initial Occupational Therapy Evaluation   Living Environment   People in home spouse  ( unable to provide physical A d/t medical hx)   Current Living Arrangements house  (farm house)   Home Accessibility stairs to enter home;stairs within home   Number of Stairs, Main Entrance 3   Number of Stairs, Within Home, Primary greater than 10 stairs   Transportation Anticipated family or friend will provide   Living Environment Comments Lives in a three story home. Pt reports she does not use upstairs often, basement has laundry room and walk in shower (typically used). On main level is bathroom- HRT, tub shower, typically sleeps on main floor in  Family lives near and states that daughter in-law and grandaughters can A as needed.  unable to A as of now.    Self-Care   Equipment Currently Used at Home shower chair   Activity/Exercise/Self-Care Comment has a shower chair and walker avaliable, considering grab bars. Baseline I in all ADLs   Instrumental Activities of Daily Living (IADL)   IADL Comments baseline I in all IADLs - family support for all IADLs   Disability/Function   Fall history within last six months no   Change in Functional Status Since Onset of Current Illness/Injury yes   General Information   Onset of Illness/Injury or Date of Surgery 10/12/21   Referring Physician Luis Manuel Iglesias MD   Patient/Family Therapy Goal Statement (OT) not stated at this time   Additional Occupational Profile Info/Pertinent History of Current Problem POD #1 s/p L4-5 TLIF   Performance Patterns (Routines, Roles, Habits) Baseline I in ADLs and IADLs   Existing Precautions/Restrictions fall;spinal  (per PT eval- no brace needed per conversation w/PA)   General Observations and Info motivated to heal and follow all precautions. Pt expresses concerns for returning home w/in 2 days d/t husbands medical status   Cognitive Status Examination   Orientation Status  orientation to person, place and time   Sensory   Sensory Comments reports none   Pain Assessment   Patient Currently in Pain Yes, see Vital Sign flowsheet  (lower back)   Posture   Posture not impaired   Range of Motion Comprehensive   General Range of Motion no range of motion deficits identified   Strength Comprehensive (MMT)   General Manual Muscle Testing (MMT) Assessment no strength deficits identified   Muscle Tone Assessment   Muscle Tone Quick Adds No deficits were identified   Bed Mobility   Bed Mobility supine-sit;sit-supine   Supine-Sit Mobile (Bed Mobility) supervision;verbal cues   Sit-Supine Mobile (Bed Mobility) supervision   Assistive Device (Bed Mobility) bed rails   Transfers   Transfers sit-stand transfer   Sit-Stand Transfer   Sit-Stand Mobile (Transfers) contact guard   Assistive Device (Sit-Stand Transfers) walker, front-wheeled   Balance   Balance Comments Uses BUE for increased balance with FWW   Clinical Impression   Criteria for Skilled Therapeutic Interventions Met (OT) yes;meets criteria   OT Diagnosis Decreased ADLs and IADLs   OT Problem List-Impairments impacting ADL problems related to;activity tolerance impaired;range of motion (ROM);pain;post-surgical precautions   Assessment of Occupational Performance 5 or more Performance Deficits   Identified Performance Deficits toileting, showering, dressing, home mgmt, meal prep.    Planned Therapy Interventions (OT) ADL retraining;transfer training;home program guidelines;progressive activity/exercise   Clinical Decision Making Complexity (OT) low complexity   Therapy Frequency (OT) Daily   Anticipated Equipment Needs Upon Discharge (OT)   (grab bars)   Risk & Benefits of therapy have been explained evaluation/treatment results reviewed;care plan/treatment goals reviewed;patient;spouse/significant other   OT Discharge Planning    OT Discharge Recommendation (DC Rec) Home with assist   OT Rationale for DC Rec Anticipate pt  will meet all OT goals for safe discharge home with A. Supportive family lives near for A besides .    Total Evaluation Time (Minutes)   Total Evaluation Time (Minutes) 10

## 2021-10-13 NOTE — PROGRESS NOTES
[facility]  Hospitalist Progress Note    Assessment & Plan   Neha Oviedo is a 81 year old female with a history of type 2 diabetes, hypertension, hypothyroidism, severe nonischemic systolic cardiomyopathy with EF of 20 to 25% in May 2021 who was admitted for L4-L5 lumbar fusion in the setting of neurogenic claudication.  Postoperatively she is doing well but blood pressure is low-normal so some of her blood pressure medications are being held.     1. DJD s/p L4-L5 fusion on 10/12/2021: The patient is doing well, currently has well controlled pain and is hemodynamically stable. Will defer diet, activity, DVT prophylaxis, and pain control to the primary team.      2.  History of hypertension: PTA Coreg reduced 12.5 mg BID (home dose 25 mg BID) with hold parameters. PTA losartan, Lasix spironolactone held given persistent hypotension. Reassess vitals in AM.  Will need careful blood pressure and volume management in the setting of severe cardiomyopathy as below.     3.  History of type 2 diabetes normal remained on dapagliflozin; A1c per her preop was 5.9.  POCT glucose ACHS. sliding scale insulin. Hold dapagliflozin likely until discharge. Hold ASA until cleared by surgery.      4.  History of hypothyroidism: PTA Synthroid     5.  GERD: PTA omeprazole     6.  Chronic systolic nonischemic cardiomyopathy: EF of just 20 to 25% in May 2021.  At one point her EF was as low as 15% back in 2005.  Reportedly has had normal coronaries on coronary angiogram and more recently has been found to have mild to moderate nonobstructive coronary disease on low-dose aspirin.  Have an ICD in place.  Also has history of mitraclip.  Follows at [facility] with the [facility] [place].  Most recent clinic note from September 17 reviewed.  --Hold losartan and reduce Coreg as above given hypotension  --Hold Lasix and spironolactone for now, reassess in the morning pending blood pressure  trend  --Hold aspirin as above     7.  ICD status     8.  Suspected CKD: Creatinine 1.51 on preop.  Avoid nephrotoxins as able. Monitor renal fxn.      9.  Postoperative nausea and vomiting: Getting antiemetics. IVF improved symptoms. Tolerating diet.     10.  Obesity BMI 31: Complicates cares    FEN: Oral hydration; Monitor; regular  Activity: PT/OT  DVT Prophylaxis: Defer to primary service  Family update: Patient to update family  Lines: Patrick removed 10/13; Hemovac per Ortho    Code Status: Full Code    Disposition: Defer to Ortho     Text Page (7am - 6pm, M-F)    Interval History   Patient is doing well today.  She is tolerating diet.  No nausea or vomiting.  Not passing gas or having bowel movements yet.  No abdominal pain.  Back pain controlled.  Continues to have right lower extremity weakness which was present on admission.  Has new onset bilateral peripheral paresthesias.  She indicates that she is had something like this before that has improved.  No chest pain or palpitations.  No respiratory symptoms.  Discussed with nursing at bedside.    -Data reviewed today: I reviewed all new labs and imaging results over the last 24 hours. I personally reviewed:     Physical Exam   Temp: 96.9  F (36.1  C) Temp src: Temporal BP: 103/42 Pulse: 84   Resp: 15 SpO2: 94 % O2 Device: None (Room air) Oxygen Delivery: 2 LPM  Vitals:    10/11/21 1000 10/12/21 0620   Weight: 75.8 kg (167 lb) 78 kg (171 lb 14.4 oz)     Vital Signs with Ranges  Temp:  [95.1  F (35.1  C)-97.4  F (36.3  C)] 96.9  F (36.1  C)  Pulse:  [75-89] 84  Resp:  [12-18] 15  BP: ()/(42-71) 103/42  MAP:  [56 mmHg-72 mmHg] 64 mmHg  Arterial Line BP: ()/(17-51) 104/20  SpO2:  [88 %-100 %] 94 %  I/O last 3 completed shifts:  In: 2337 [P.O.:250; I.V.:2087]  Out: 1200 [Urine:700; Drains:300; Blood:200]    Constitutional: Awake, alert, cooperative, no apparent distress. Non-toxic. Appears stated age.   HEENT: Atraumatic. Normocephalic. Conjunctiva  non-injected. Sclera anicteric. MMM.   Respiratory: Moves air bilaterally. Clear to auscultation bilaterally, no crackles or wheezing  Cardiovascular: Regular rate and rhythm, normal S1 and S2, and no murmur noted  GI: Normal bowel sounds, soft, non-distended, non-tender  Skin/Integumen: No rashes, no cyanosis, no edema    Medications       acetaminophen  975 mg Oral Q8H     calcium carbonate-vitamin D  1 tablet Oral TID AC     carvedilol  12.5 mg Oral BID AC     influenza vac high-dose quad  0.7 mL Intramuscular Prior to discharge     insulin aspart  1-7 Units Subcutaneous TID AC     insulin aspart  1-5 Units Subcutaneous At Bedtime     levothyroxine  75 mcg Oral Daily     omeprazole  20 mg Oral QAM AC     polyethylene glycol  17 g Oral Daily     senna-docusate  1 tablet Oral BID     sodium chloride (PF)  3 mL Intracatheter Q8H     cholecalciferol  25 mcg Oral BID     Data   Recent Labs   Lab 10/13/21  1652 10/13/21  1228 10/13/21  0944 10/13/21  0612 10/13/21  0301   HGB  --   --   --  11.4*  --    NA  --   --   --  138  --    POTASSIUM  --   --   --  4.8  --    CHLORIDE  --   --   --  110*  --    CO2  --   --   --  20  --    BUN  --   --   --  31*  --    CR  --   --   --  1.37*  --    ANIONGAP  --   --   --  8  --    DIPIKA  --   --   --  8.2*  --    * 141* 127* 123*   < >    < > = values in this interval not displayed.     No results found for this or any previous visit (from the past 24 hour(s)).

## 2021-10-13 NOTE — PLAN OF CARE
Afebrile.  SBPs lowest 90s/40s, asymptomatic.  Pain managed with minimal PRN oral pain meds + cold.  No nausea.  LS clear bilaterally, weaned off O2, RA sats mid-high 90s, encouraged hourly CDB/IS x10.  CMS+ except N/T bilat. hands/fingers, improving.  Edema BUEs & bilat. ankles improving, elevated.  Drsg CDI.  Up A1 belt + walker, sat up recliner, ambulating hallway.  Patrick DC, DTV, bladder scanned max 140ml - monitor.  Hemovac, DC POD2.  Anticipated DC home w/home care.

## 2021-10-14 NOTE — PROGRESS NOTES
Care Management Follow Up    Length of Stay (days): 2    Expected Discharge Date: 10/14/2021     Concerns to be Addressed: Discharge Planning  Patient plan of care discussed at interdisciplinary rounds: Yes    Anticipated Discharge Disposition: Home, Home Care     Anticipated Discharge Services: None  Anticipated Discharge DME: None    Patient/family educated on Medicare website which has current facility and service quality ratings: yes  Education Provided on the Discharge Plan:  Yes  Patient/Family in Agreement with the Plan: yes    Referrals Placed by CM/SW:  None  Private pay costs discussed: Not applicable    Additional Information:  Spoke with daughter in law Rosina over the phone. Given update on plan of care and discharge recommendations for home with assist and home care PT.  Rosina stated concerns with patient returning home with needing assist. Stated that patient's  is currently recovering and unable to provide physical assistance. Rosina stated would be able to stay with and assist patient through the weekend. Unable to assist next week due to returning to work. Rosina and patient's son live about 25 minutes from the patient.   Reassured Rosina that patient would continue to work with therapy today. Discussed that TCU is not recommended at this time.     Rosina requests call from PT/OT to further discuss recommendations and assistance needed. Rosina will be unavailable from [phone] today.       Shari Rodriges, EPIFANIO Rodriges RN Case Manager  Inpatient Care Coordination  [facility]   [phone]

## 2021-10-14 NOTE — PLAN OF CARE
Occupational Therapy Discharge Summary    Reason for therapy discharge:    All goals and outcomes met, no further needs identified.    Progress towards therapy goal(s). See goals on Care Plan in [facility] electronic health record for goal details.  Goals met    Therapy recommendation(s):    Defer to Ortho team.

## 2021-10-14 NOTE — PROGRESS NOTES
Care Management Follow Up    Length of Stay (days): 2    Expected Discharge Date: 10/14/2021     Concerns to be Addressed:     Discharge Planning  Patient plan of care discussed at interdisciplinary rounds: Yes    Anticipated Discharge Disposition: Home, Home Care     Anticipated Discharge Services: None  Anticipated Discharge DME: None    Patient/family educated on Medicare website which has current facility and service quality ratings: yes  Education Provided on the Discharge Plan:  Yes  Patient/Family in Agreement with the Plan: yes    Referrals Placed by CM/SW:  Home Care  Private pay costs discussed: Not applicable    Additional Information:  CM following for discharge planning. Home Care PT recommended. Discussed recommended assistance at home. Patient stated that daughter in law Rosina would be staying with the patient through Monday to assist.   Patient understanding and agreeable to home care PT. Pt/family was provided with the Medicare Compare list for Home Care.  Discussed associated Medicare star ratings to assist with choice for referrals/discharge planning Yes    Education was given to pt/family that star ratings are updated/maintained by Medicare and can be reviewed by visiting www.medicare.gov Yes    Patient prefers [facility], [phone]. Referral faxed to [phone].  If [facility] unable to accept, patient would prefer [facility], [facility], or [facility].     Update 1500: Received call from [facility] that they do not provide services to patient's area of [place]. Called [facility] and they do not have PT home care services in this area.  Referral sent to [facility]. Yanick Chamorro [phone] Office [phone] Fax [phone]    Update 1605: Received call from [facility] that [facility] is able to accept with start on 10/18/21. Will need provider to approve and amend discharge order to reflect delay with start of care to 10/18/21.          Shari Rodriges, RN      Shari Rodriges RN Case Manager  Inpatient Care Coordination  [facility]   [phone]

## 2021-10-14 NOTE — PLAN OF CARE
Patient vital signs are at baseline: Yes  Patient able to ambulate as they were prior to admission or with assist devices provided by therapies during their stay:  Yes  Patient MUST void prior to discharge:  Yes  Patient able to tolerate oral intake:  Yes  Pain has adequate pain control using Oral analgesics:  Yes    Pt is A&Ox4. VSS. Incisional pain. PRN 5 mg oxycodone given once. Sterile dressing change complete. Hemovac removed. CMS intact. Voiding in the bathroom. Up SBA with a gait belt and walker. Tolerating a regular diet. Monitoring BGs. Plan to discharge this evening with son to home with home PT.

## 2021-10-14 NOTE — PLAN OF CARE
Pt A/Ox4. Afebrile. Pain managed with minimal PRN oral pain meds + cold.  No nausea.  LS clear bilaterally, weaned off O2, RA sats mid-high 90s. Hemovac out put this shift was 200ml, DC POD2.  Anticipated DC home w/home care.

## 2021-10-14 NOTE — DISCHARGE INSTRUCTIONS
Your home care referral was sent to [facility]  If you haven't heard from them within the next 24-48 hours,  Please call them at [phone]  Anticipate first visit on 10/18/21        Incision Instructions     Your incision is covered with an Aquacel dressing. This is a waterproof dressing that you are able to shower with. Do not submerge your dressing in water. Do not remove dressing until you are seen in clinic for your two week post op visit.     However, if you notice a significant amount of drainage on your dressing, or there is any concern for possible incision infection, remove to inspect the incision.    If you do remove the dressing prior to your two week visit, please cover with simple dressing. We suggest a folded piece of gauze with a few pieces of tape to hold in place. This will allow the incision to remain protected. Please make sure to cover your dressing with plastic/waterproof dressing to keep it waterproof if you have removed the initial dressing while in the shower. We ask that you continue to waterproof it until you are seen at your two week post op appointment.     Activity Instructions   Minimize bending, lifting, twisting. No lifting greater than 10 lbs. Remember, 1 gallon of milk is 8 lbs.    Please call our office at [phone] should you develop the following issues:  1.) Increased/persistent redness, bleeding, localized warmth, increased swelling, and/or drainage (yellow/clear/odorous) incision site.   2.) Increased pain not controlled with oral pain medications   3.) Persistent headache, dizziness, lightheaded  4.) Persistent constipation despite taking OTC stool softeners as directed  5.) Calf pain/swollen/hard/warm area, swelling chest pain or shortness of breath  6.) Increased/persistent numbness or tingling in arm or legs, weakness in extremities or falls  7.) Generalized feelings of illness  8.) Persistent fever, chills, sweats, Temp 101 or greater  9.) Trouble voiding,  incontinence of bowel and/or bladder  10.) Too sleepy-could be amount of pain medication  11.) If unable to wake call 911    Other instructions:  1.) No heavy lifting, nothing more than 6-10lbs. Minimize your bending, lifting, and twisting. Attempt to avoid prolonged period of sitting. Follow physical therapy restrictions and exercises - slowly increase your activity.   2.) Avoid sitting or laying in one position too long, walk as tolerated, log roll  3.) Wear brace when up per physical therapy, inspect skin under brace daily and call md if sore area starts  4.) Take an over the counter stool softener as directed while on narcotics to prevent constipation or to stay regular. Take a suppository or laxative if no bowel movement in 2 days despite taking softener     Follow Up   Follow up with Arik Simons PA-C in two weeks at [facility]. Please call Flip [phone] to schedule.

## 2021-10-14 NOTE — PROGRESS NOTES
[facility]  Hospitalist Progress Note    Assessment & Plan   Neha Oviedo is a 81 year old female with a history of type 2 diabetes, hypertension, hypothyroidism, severe nonischemic systolic cardiomyopathy with EF of 20 to 25% in May 2021 who was admitted for L4-L5 lumbar fusion in the setting of neurogenic claudication.  Postoperatively she is doing well but blood pressure is low-normal so some of her blood pressure medications are being held.     1. DJD s/p L4-L5 fusion on 10/12/2021: The patient is doing well, currently has well controlled pain and is hemodynamically stable. Will defer diet, activity, DVT prophylaxis, and pain control to the primary team. Plan to discharge today.      2.  History of hypertension: PTA Coreg reduced 12.5 mg BID (home dose 25 mg BID) with hold parameters. PTA losartan, Lasix spironolactone held given persistent hypotension. Patient to continue to hold lasix, losartan, and spironolactone at discharge. Dose reduce carvedilol. BP monitoring instructions provided. Patient to follow-up with PCP for BP monitoring and resume medications.      3.  History of type 2 diabetes normal remained on dapagliflozin; A1c per her preop was 5.9.  POCT glucose ACHS. sliding scale insulin. Hold dapagliflozin likely until discharged. Defer ASA to surgery.      4.  History of hypothyroidism: PTA Synthroid     5.  GERD: PTA omeprazole     6.  Chronic systolic nonischemic cardiomyopathy: EF of just 20 to 25% in May 2021.  At one point her EF was as low as 15% back in 2005.  Reportedly has had normal coronaries on coronary angiogram and more recently has been found to have mild to moderate nonobstructive coronary disease on low-dose aspirin.  Have an ICD in place.  Also has history of mitraclip.  Follows at [facility] with [facility] [place] [facility] [place].  Most recent clinic note from September 17 reviewed.  --Hold losartan and reduce Coreg as above given  hypotension  --Hold Lasix and spironolactone for now, reassess in the morning pending blood pressure trend  --Hold aspirin as above  --Given low normal BP - medications held at time of discharge with close BP monitoring and PCP follow-up.     7.  ICD status     8.  Suspected CKD: Creatinine 1.51 on preop.  Avoid nephrotoxins as able. Monitor renal fxn. Repeat Bmp with PCP.      9.  Postoperative nausea and vomiting: Getting antiemetics. IVF improved symptoms. Tolerating diet. Resolved.     10.  Obesity BMI 31: Complicates cares    FEN: Oral hydration; Monitor; regular  Activity: PT/OT  DVT Prophylaxis: Defer to primary service  Family update: Patient to update family  Lines: Mendenhall removed 10/13; Hemovac removed 10/14    Code Status: Full Code    Disposition: Defer to Ortho     Text Page (7am - 6pm, M-F)    Interval History   Patient is doing well today.  Upper extremity numbness/tingling resolved. Improved function of right lower extremity. Pain controled. No lightheadedness/dizziness. No chest pain or palpitations. No SOB or respiratory symptoms. Tolerating diet. No gas/BM yet but asymptomatic. Good urine output following mendenhall removal. Discussed with nursing.     -Data reviewed today: I reviewed all new labs and imaging results over the last 24 hours. I personally reviewed:     Physical Exam   Temp: 97  F (36.1  C) Temp src: Temporal BP: 98/48 Pulse: 81   Resp: 16 SpO2: 94 % O2 Device: None (Room air)    Vitals:    10/11/21 1000 10/12/21 0620   Weight: 75.8 kg (167 lb) 78 kg (171 lb 14.4 oz)     Vital Signs with Ranges  Temp:  [97  F (36.1  C)-99.1  F (37.3  C)] 97  F (36.1  C)  Pulse:  [71-81] 81  Resp:  [16-18] 16  BP: ()/(46-53) 98/48  SpO2:  [93 %-98 %] 94 %  I/O last 3 completed shifts:  In: 581 [P.O.:440; I.V.:141]  Out: 1000 [Urine:800; Drains:200]    Constitutional: Awake, alert, cooperative, no apparent distress. Non-toxic. Appears stated age.   HEENT: Atraumatic. Normocephalic. Conjunctiva  non-injected. Sclera anicteric. MMM.   Respiratory: Moves air bilaterally. Clear to auscultation bilaterally, no crackles or wheezing  Cardiovascular: Regular rate and rhythm, normal S1 and S2, and no murmur noted  GI: Normal bowel sounds, soft, non-distended, non-tender  Skin/Integumen: No rashes, no cyanosis, no edema    Medications       acetaminophen  975 mg Oral Q8H     calcium carbonate-vitamin D  1 tablet Oral TID AC     carvedilol  12.5 mg Oral BID AC     insulin aspart  1-7 Units Subcutaneous TID AC     insulin aspart  1-5 Units Subcutaneous At Bedtime     levothyroxine  75 mcg Oral Daily     omeprazole  20 mg Oral QAM AC     polyethylene glycol  17 g Oral Daily     senna-docusate  1 tablet Oral BID     sodium chloride (PF)  3 mL Intracatheter Q8H     cholecalciferol  25 mcg Oral BID     Data   Recent Labs   Lab 10/14/21  1134 10/14/21  0757 10/14/21  0637 10/13/21  0944 10/13/21  0612   HGB  --   --  10.0*  --  11.4*   NA  --   --  133  --  138   POTASSIUM  --   --  4.6  --  4.8   CHLORIDE  --   --  105  --  110*   CO2  --   --  24  --  20   BUN  --   --  30  --  31*   CR  --   --  1.44*  --  1.37*   ANIONGAP  --   --  4  --  8   DIPIKA  --   --  8.4*  --  8.2*   * 127* 125*  118*   < > 123*    < > = values in this interval not displayed.     No results found for this or any previous visit (from the past 24 hour(s)).

## 2021-10-14 NOTE — PROGRESS NOTES
"Ortho Rounding Note    S: Pt up in chair. LBP reported currently controlled with PO oxy and apap. Denies n/v/f/c, SOB, CP. No ortho concerns.     O:  Vital signs:   Blood pressure 98/48, pulse 81, temperature 97  F (36.1  C), temperature source Temporal, resp. rate 16, height 1.575 m (5' 2\"), weight 78 kg (171 lb 14.4 oz), SpO2 94 %.  Estimated body mass index is 31.44 kg/m  as calculated from the following:    Height as of this encounter: 1.575 m (5' 2\").    Weight as of this encounter: 78 kg (171 lb 14.4 oz).      Intake/Output Summary (Last 24 hours) at 10/14/2021 1338  Last data filed at 10/14/2021 1220  Gross per 24 hour   Intake 483 ml   Output 1350 ml   Net -867 ml         Dressings c/d/i  5/5 motor and SPLT in BL UE and LE    A:  POD #2 s/p L4-5 TLIF    P:  General: Pt doing very well orthopaedically. Pre op symptoms remain improved. Plan for continued daily cares.   Pain: PO  Act: up ad nelia, with therapy  DVT: Mech only  ID: routine postop abx to be completed 24 hours after surgery  Dispo: Plan to discharge to home later tonight with family. Plan for home care physical therapy per PT recommendation. MD face to face complete. OK to     Appreciate Medicine consult for medical management    Arik Simons PA-C    "

## 2021-10-15 NOTE — PLAN OF CARE
PM SHIFT   Pt had to wait until her ride could get here to pick her up around 1930. Pts son and dgt in law arrived and this staff reviewed the AVS with the patient and the family. All questions answered and pt given and signed for her filled 5 sripts. Pt assisted to her sons car via WC and the NA. Pt given fransisca socks on prior to discharge. Pt stated she had all of her belongings with her on discharge. Pt left around 1945.

## 2021-10-15 NOTE — PLAN OF CARE
Physical Therapy Discharge Summary    Reason for therapy discharge:    Discharged to home with home therapy.    Progress towards therapy goal(s). See goals on Care Plan in [facility] electronic health record for goal details.  Goals partially met.  Barriers to achieving goals:   discharge from facility. Pt continued to need min A for bed mobility, min A on stairs    Therapy recommendation(s):    Continued therapy is recommended.  Rationale/Recommendations:  home therapy for improving bed mob, stairs.

## 2021-10-26 NOTE — DISCHARGE SUMMARY
[facility] Discharge Summary    Neha Oviedo MRN# [id number]   Age: 81 year old YOB: 1940     Date of Admission:  10/12/2021  Date of Discharge::  10/14/2021  8:15 PM  Admitting Physician:  Mark Tomlin MD  Discharge Physician:  Arik Simons PAAmandeepC     Home clinic: [facility]           Admission Diagnoses:   Spinal stenosis [M48.00]  Neurogenic claudication (H) [G95.19]  Spondylolisthesis [M43.10]  DDD (degenerative disc disease), cervical [M50.30]  S/P lumbar fusion [Z98.1]          Discharge Diagnosis:   Patient Active Problem List    Diagnosis     S/P lumbar fusion             Procedures:   Procedure(s): L4-5 Transforaminal lumbar interbody fusion        No other procedures performed during this admission           Medications Prior to Admission:     No medications prior to admission.             Discharge Medications:     Discharge Medication List as of 10/14/2021  5:09 PM      START taking these medications    Details   acetaminophen (TYLENOL) 325 MG tablet Take 2 tablets (650 mg) by mouth every 4 hours as needed for other (mild pain), Disp-100 tablet, R-0, Local Print      calcium carbonate-vitamin D (OS-DIPIKA WITH D) 500-200 MG-UNIT tablet Take 1 tablet by mouth 3 times daily (before meals), Disp-270 tablet, R-0, Local Print      oxyCODONE (ROXICODONE) 5 MG tablet Take 1-2 tablets (5-10 mg) by mouth every 6 hours as needed for pain (Moderate to Severe), Disp-30 tablet, R-0, Local Print      senna-docusate (SENOKOT-S/PERICOLACE) 8.6-50 MG tablet Take 1-2 tablets by mouth 2 times daily Take while on oral narcotics to prevent or treat constipation., Disp-30 tablet, R-0, Local PrintWhile taking narcotics      Vitamin D3 (CHOLECALCIFEROL) 25 mcg (1000 units) tablet Take 1 tablet (25 mcg) by mouth 2 times daily, Disp-180 tablet, R-0, Local Print         CONTINUE these medications which have NOT CHANGED    Details   carvedilol (COREG) 25 MG tablet Take 25 mg by  mouth 2 times daily, Historical      dapagliflozin (FARXIGA) 10 MG TABS tablet Take 10 mg by mouth daily, 10 mg, Oral, DAILY Starting Thu 7/15/2021, Historical      furosemide (LASIX) 20 MG tablet Take 20 mg by mouth daily as needed for edema, Historical      levothyroxine (SYNTHROID/LEVOTHROID) 75 MCG tablet Take 75 mcg by mouth daily, Historical      !! losartan (COZAAR) 100 MG tablet Take 100 mg by mouth daily Total dose 150 mg, Historical      !! losartan (COZAAR) 50 MG tablet Take 50 mg by mouth daily Total dose 150 mg daily, Historical      MAGNESIUM PO Take 300 mg by mouth daily, Historical      Menthol-Methyl Salicylate (SALONPAS PAIN RELIEF PATCH EX) Externally apply topically daily as needed, Historical      omeprazole (PRILOSEC) 20 MG DR capsule Take 20 mg by mouth daily, Historical      spironolactone (ALDACTONE) 25 MG tablet Take 25 mg by mouth daily, Historical       !! - Potential duplicate medications found. Please discuss with provider.      STOP taking these medications       acetaminophen-codeine (TYLENOL #3) 300-30 MG tablet Comments:   Reason for Stopping:         amoxicillin-clavulanate (AUGMENTIN) 875-125 MG tablet Comments:   Reason for Stopping:         aspirin (ASA) 81 MG EC tablet Comments:   Reason for Stopping:         calcium carbonate-vitamin D 600-125 MG-UNIT TABS Comments:   Reason for Stopping:                     Consultations:   PT, OT, Hospitalist               Hospital Course:   The patient's hospital course was unremarkable.  She recovered as anticipated and experienced no post-operative complications.           Discharge Instructions and Follow-Up:   Discharge diet: Regular   Discharge activity: Activity as tolerated  Minimize bending, lifting, twisting. No lifting greater than 10 lbs. Remember, 1 gallon of milk is 8 lbs.     Discharge follow-up: Follow up with Arik Simons PA-C in two weeks at [facility]. Please call Flip [phone] to schedule.      Wound  care: Your incision is covered with an Aquacel dressing. This is a waterproof dressing that you are able to shower with. Do not submerge your dressing in water. Do not remove dressing until you are seen in clinic for your two week post op visit.     However, if you notice a significant amount of drainage on your dressing, or there is any concern for possible incision infection, remove to inspect the incision.    If you do remove the dressing prior to your two week visit, please cover with simple dressing. We suggest a folded piece of gauze with a few pieces of tape to hold in place. This will allow the incision to remain protected. Please make sure to cover your dressing with plastic/waterproof dressing to keep it waterproof if you have removed the initial dressing while in the shower. We ask that you continue to waterproof it until you are seen at your two week post op appointment.              Discharge Disposition:   Discharged to home      Attestation:  I have reviewed today's vital signs, notes, medications, labs and imaging.    Arik Simons PA-C

## 2022-01-01 ENCOUNTER — HOSPITAL ENCOUNTER (OUTPATIENT)
Dept: NUCLEAR MEDICINE | Facility: CLINIC | Age: 82
Setting detail: NUCLEAR MEDICINE
End: 2022-02-03
Attending: FAMILY MEDICINE
Payer: MEDICARE

## 2022-01-01 ENCOUNTER — HOSPITAL ENCOUNTER (OUTPATIENT)
Dept: GENERAL RADIOLOGY | Facility: CLINIC | Age: 82
End: 2022-02-03
Attending: FAMILY MEDICINE
Payer: MEDICARE

## 2022-01-01 DIAGNOSIS — R06.02 SOB (SHORTNESS OF BREATH): ICD-10-CM

## 2022-01-01 DIAGNOSIS — Z11.59 ENCOUNTER FOR SCREENING FOR OTHER VIRAL DISEASES: Primary | ICD-10-CM

## 2022-01-01 DIAGNOSIS — R79.89 ELEVATED D-DIMER: ICD-10-CM

## 2022-01-01 PROCEDURE — 343N000001 HC RX 343: Performed by: RADIOLOGY

## 2022-01-01 PROCEDURE — 78580 LUNG PERFUSION IMAGING: CPT

## 2022-01-01 PROCEDURE — 71046 X-RAY EXAM CHEST 2 VIEWS: CPT

## 2022-01-01 PROCEDURE — A9540 TC99M MAA: HCPCS | Performed by: RADIOLOGY

## 2022-01-01 RX ADMIN — KIT FOR THE PREPARATION OF TECHNETIUM TC 99M ALBUMIN AGGREGATED 3.3 MCI.: 2.5 INJECTION, POWDER, FOR SOLUTION INTRAVENOUS at 10:54

## (undated) DEVICE — MARKER SPHERES PASSIVE MEDT PACK 1 8801071

## (undated) DEVICE — PAD PROAXIS TABLE KIT SPK10182

## (undated) DEVICE — LINEN ORTHO ACL PACK 5447

## (undated) DEVICE — DEVICE DUST COLLECTOR BONE BOX S-3500

## (undated) DEVICE — DRSG TEGADERM 4X4 3/4" 1626W

## (undated) DEVICE — PREP DURAPREP 26ML APL 8630

## (undated) DEVICE — SUCTION MANIFOLD NEPTUNE 2 SYS 4 PORT 0702-020-000

## (undated) DEVICE — LINEN DRAPE 54X72" 5467

## (undated) DEVICE — DRSG AQUACEL AG HYDROFIBER  3.5X10" 422605

## (undated) DEVICE — SU VICRYL 2-0 CT-1 27" UND J259H

## (undated) DEVICE — SYR 03ML LL W/O NDL

## (undated) DEVICE — DRAIN HEMOVAC RESERVOIR KIT 10FR 1/8" MED 00-2550-002-10

## (undated) DEVICE — ESU PENCIL SMOKE EVAC W/ROCKER SWITCH 0703-047-000

## (undated) DEVICE — SPONGE COTTONOID 1/2X1/2" 80-1400

## (undated) DEVICE — PACK SMALL SPINE RIDGES

## (undated) DEVICE — ESU GROUND PAD ADULT W/CORD E7507

## (undated) DEVICE — ADH SKIN CLOSURE PREMIERPRO EXOFIN 1.0ML 3470

## (undated) DEVICE — ESU ELEC BLADE 2.75" COATED/INSULATED E1455

## (undated) DEVICE — TOOL DISSECT MIDAS MR8 14CM MATCH HEAD 3MM MR8-14MH30

## (undated) DEVICE — GLOVE PROTEXIS W/NEU-THERA 8.0  2D73TE80

## (undated) DEVICE — SOL NACL 0.9% IRRIG 1000ML BOTTLE 2F7124

## (undated) DEVICE — SU ETHILON 2-0 PS 18" 585H

## (undated) DEVICE — SPONGE RAY-TEC 4X8" 7318

## (undated) DEVICE — SU STRATAFIX PDS PLUS 1 CT-1 12" SXPP1A443

## (undated) DEVICE — SUCTION FRAZIER 12FR W/OBTURATOR 33120

## (undated) DEVICE — LINEN POUCH DBL 5427

## (undated) DEVICE — SU MONOCRYL 3-0 PS-2 27" Y427H

## (undated) DEVICE — MARKER SPHERES PASSIVE MEDT PACK 5 8801075

## (undated) DEVICE — PACK SET-UP STD 9102

## (undated) DEVICE — DRAPE LAP W/ARMBOARD 29410

## (undated) DEVICE — RX SURGIFLO HEMOSTATIC MATRIX 8ML 2991

## (undated) DEVICE — CATH TRAY FOLEY SURESTEP 16FR DRAIN BAG STATOCK A899916

## (undated) DEVICE — GLOVE PROTEXIS BLUE W/NEU-THERA 8.5  2D73EB85

## (undated) DEVICE — CUSHION INSERT LG PRONE VIEW JACKSON TABLE

## (undated) DEVICE — BLADE BONE MILL STRK 5.0MM MED 5400-701-000

## (undated) DEVICE — DRAPE STERI TOWEL LG 1010

## (undated) DEVICE — DRAPE COVER C-ARM SEAMLESS SNAP-KAP 03-KP26 LATEX FREE

## (undated) DEVICE — SUCTION TIP YANKAUER W/O VENT K86

## (undated) DEVICE — GOWN IMPERVIOUS SPECIALTY XLG/XLONG 32474

## (undated) RX ORDER — LIDOCAINE HYDROCHLORIDE 10 MG/ML
INJECTION, SOLUTION EPIDURAL; INFILTRATION; INTRACAUDAL; PERINEURAL
Status: DISPENSED
Start: 2021-01-01

## (undated) RX ORDER — EPHEDRINE SULFATE 50 MG/ML
INJECTION, SOLUTION INTRAMUSCULAR; INTRAVENOUS; SUBCUTANEOUS
Status: DISPENSED
Start: 2021-01-01

## (undated) RX ORDER — PROPOFOL 10 MG/ML
INJECTION, EMULSION INTRAVENOUS
Status: DISPENSED
Start: 2021-01-01

## (undated) RX ORDER — FENTANYL CITRATE 50 UG/ML
INJECTION, SOLUTION INTRAMUSCULAR; INTRAVENOUS
Status: DISPENSED
Start: 2021-01-01

## (undated) RX ORDER — FENTANYL CITRATE-0.9 % NACL/PF 10 MCG/ML
PLASTIC BAG, INJECTION (ML) INTRAVENOUS
Status: DISPENSED
Start: 2021-01-01

## (undated) RX ORDER — ONDANSETRON 2 MG/ML
INJECTION INTRAMUSCULAR; INTRAVENOUS
Status: DISPENSED
Start: 2021-01-01

## (undated) RX ORDER — CLINDAMYCIN PHOSPHATE 900 MG/50ML
INJECTION, SOLUTION INTRAVENOUS
Status: DISPENSED
Start: 2021-01-01

## (undated) RX ORDER — DEXAMETHASONE SODIUM PHOSPHATE 4 MG/ML
INJECTION, SOLUTION INTRA-ARTICULAR; INTRALESIONAL; INTRAMUSCULAR; INTRAVENOUS; SOFT TISSUE
Status: DISPENSED
Start: 2021-01-01

## (undated) RX ORDER — GLYCOPYRROLATE 0.2 MG/ML
INJECTION INTRAMUSCULAR; INTRAVENOUS
Status: DISPENSED
Start: 2021-01-01

## (undated) RX ORDER — BUPIVACAINE HYDROCHLORIDE AND EPINEPHRINE 2.5; 5 MG/ML; UG/ML
INJECTION, SOLUTION EPIDURAL; INFILTRATION; INTRACAUDAL; PERINEURAL
Status: DISPENSED
Start: 2021-01-01

## (undated) RX ORDER — NEOSTIGMINE METHYLSULFATE 1 MG/ML
VIAL (ML) INJECTION
Status: DISPENSED
Start: 2021-01-01

## (undated) RX ORDER — PHENYLEPHRINE HYDROCHLORIDE 10 MG/ML
INJECTION INTRAVENOUS
Status: DISPENSED
Start: 2021-01-01